# Patient Record
Sex: MALE | Race: WHITE | NOT HISPANIC OR LATINO | Employment: OTHER | URBAN - METROPOLITAN AREA
[De-identification: names, ages, dates, MRNs, and addresses within clinical notes are randomized per-mention and may not be internally consistent; named-entity substitution may affect disease eponyms.]

---

## 2021-04-15 ENCOUNTER — APPOINTMENT (OUTPATIENT)
Dept: RADIOLOGY | Facility: MEDICAL CENTER | Age: 86
DRG: 243 | End: 2021-04-15
Attending: EMERGENCY MEDICINE
Payer: COMMERCIAL

## 2021-04-15 ENCOUNTER — APPOINTMENT (OUTPATIENT)
Dept: CARDIOLOGY | Facility: MEDICAL CENTER | Age: 86
DRG: 243 | End: 2021-04-15
Attending: INTERNAL MEDICINE
Payer: COMMERCIAL

## 2021-04-15 ENCOUNTER — HOSPITAL ENCOUNTER (INPATIENT)
Facility: MEDICAL CENTER | Age: 86
LOS: 1 days | DRG: 243 | End: 2021-04-16
Attending: EMERGENCY MEDICINE | Admitting: INTERNAL MEDICINE
Payer: COMMERCIAL

## 2021-04-15 ENCOUNTER — APPOINTMENT (OUTPATIENT)
Dept: RADIOLOGY | Facility: MEDICAL CENTER | Age: 86
DRG: 243 | End: 2021-04-15
Attending: INTERNAL MEDICINE
Payer: COMMERCIAL

## 2021-04-15 ENCOUNTER — APPOINTMENT (OUTPATIENT)
Dept: CARDIOLOGY | Facility: MEDICAL CENTER | Age: 86
DRG: 243 | End: 2021-04-15
Attending: NURSE PRACTITIONER
Payer: COMMERCIAL

## 2021-04-15 DIAGNOSIS — I44.2 COMPLETE HEART BLOCK (HCC): ICD-10-CM

## 2021-04-15 DIAGNOSIS — I10 HYPERTENSION, UNSPECIFIED TYPE: ICD-10-CM

## 2021-04-15 PROBLEM — I73.9 PERIPHERAL ARTERY DISEASE (HCC): Chronic | Status: ACTIVE | Noted: 2021-04-15

## 2021-04-15 PROBLEM — K21.9 GERD (GASTROESOPHAGEAL REFLUX DISEASE): Status: ACTIVE | Noted: 2021-04-15

## 2021-04-15 PROBLEM — N40.0 BPH (BENIGN PROSTATIC HYPERPLASIA): Chronic | Status: ACTIVE | Noted: 2021-04-15

## 2021-04-15 PROBLEM — I16.0 HYPERTENSIVE URGENCY: Status: ACTIVE | Noted: 2021-04-15

## 2021-04-15 PROBLEM — E78.5 HYPERLIPIDEMIA: Chronic | Status: ACTIVE | Noted: 2021-04-15

## 2021-04-15 LAB
ALBUMIN SERPL BCP-MCNC: 4.1 G/DL (ref 3.2–4.9)
ALBUMIN/GLOB SERPL: 1.8 G/DL
ALP SERPL-CCNC: 69 U/L (ref 30–99)
ALT SERPL-CCNC: 13 U/L (ref 2–50)
ANION GAP SERPL CALC-SCNC: 7 MMOL/L (ref 7–16)
APTT PPP: 22.6 SEC (ref 24.7–36)
AST SERPL-CCNC: 19 U/L (ref 12–45)
BASOPHILS # BLD AUTO: 0.3 % (ref 0–1.8)
BASOPHILS # BLD: 0.03 K/UL (ref 0–0.12)
BILIRUB SERPL-MCNC: 0.4 MG/DL (ref 0.1–1.5)
BUN SERPL-MCNC: 20 MG/DL (ref 8–22)
CALCIUM SERPL-MCNC: 8.9 MG/DL (ref 8.5–10.5)
CHLORIDE SERPL-SCNC: 108 MMOL/L (ref 96–112)
CO2 SERPL-SCNC: 25 MMOL/L (ref 20–33)
CREAT SERPL-MCNC: 0.82 MG/DL (ref 0.5–1.4)
EKG IMPRESSION: NORMAL
EOSINOPHIL # BLD AUTO: 0.1 K/UL (ref 0–0.51)
EOSINOPHIL NFR BLD: 0.9 % (ref 0–6.9)
ERYTHROCYTE [DISTWIDTH] IN BLOOD BY AUTOMATED COUNT: 51.2 FL (ref 35.9–50)
GLOBULIN SER CALC-MCNC: 2.3 G/DL (ref 1.9–3.5)
GLUCOSE SERPL-MCNC: 95 MG/DL (ref 65–99)
HCT VFR BLD AUTO: 39.1 % (ref 42–52)
HGB BLD-MCNC: 12.7 G/DL (ref 14–18)
IMM GRANULOCYTES # BLD AUTO: 0.04 K/UL (ref 0–0.11)
IMM GRANULOCYTES NFR BLD AUTO: 0.4 % (ref 0–0.9)
INR PPP: 0.99 (ref 0.87–1.13)
LYMPHOCYTES # BLD AUTO: 5.06 K/UL (ref 1–4.8)
LYMPHOCYTES NFR BLD: 46.3 % (ref 22–41)
MAGNESIUM SERPL-MCNC: 2.2 MG/DL (ref 1.5–2.5)
MCH RBC QN AUTO: 31.8 PG (ref 27–33)
MCHC RBC AUTO-ENTMCNC: 32.5 G/DL (ref 33.7–35.3)
MCV RBC AUTO: 98 FL (ref 81.4–97.8)
MONOCYTES # BLD AUTO: 0.63 K/UL (ref 0–0.85)
MONOCYTES NFR BLD AUTO: 5.8 % (ref 0–13.4)
NEUTROPHILS # BLD AUTO: 5.07 K/UL (ref 1.82–7.42)
NEUTROPHILS NFR BLD: 46.3 % (ref 44–72)
NRBC # BLD AUTO: 0 K/UL
NRBC BLD-RTO: 0 /100 WBC
PHOSPHATE SERPL-MCNC: 3.1 MG/DL (ref 2.5–4.5)
PLATELET # BLD AUTO: 201 K/UL (ref 164–446)
PMV BLD AUTO: 10.6 FL (ref 9–12.9)
POTASSIUM SERPL-SCNC: 4.1 MMOL/L (ref 3.6–5.5)
PROT SERPL-MCNC: 6.4 G/DL (ref 6–8.2)
PROTHROMBIN TIME: 13.4 SEC (ref 12–14.6)
RBC # BLD AUTO: 3.99 M/UL (ref 4.7–6.1)
SARS-COV+SARS-COV-2 AG RESP QL IA.RAPID: NOTDETECTED
SODIUM SERPL-SCNC: 140 MMOL/L (ref 135–145)
SPECIMEN SOURCE: NORMAL
TROPONIN T SERPL-MCNC: 18 NG/L (ref 6–19)
TSH SERPL DL<=0.005 MIU/L-ACNC: 2.84 UIU/ML (ref 0.38–5.33)
WBC # BLD AUTO: 10.9 K/UL (ref 4.8–10.8)

## 2021-04-15 PROCEDURE — 85610 PROTHROMBIN TIME: CPT

## 2021-04-15 PROCEDURE — 700101 HCHG RX REV CODE 250

## 2021-04-15 PROCEDURE — 33208 INSRT HEART PM ATRIAL & VENT: CPT

## 2021-04-15 PROCEDURE — 99152 MOD SED SAME PHYS/QHP 5/>YRS: CPT | Performed by: INTERNAL MEDICINE

## 2021-04-15 PROCEDURE — C9803 HOPD COVID-19 SPEC COLLECT: HCPCS | Performed by: EMERGENCY MEDICINE

## 2021-04-15 PROCEDURE — 71045 X-RAY EXAM CHEST 1 VIEW: CPT

## 2021-04-15 PROCEDURE — 700111 HCHG RX REV CODE 636 W/ 250 OVERRIDE (IP): Performed by: INTERNAL MEDICINE

## 2021-04-15 PROCEDURE — 93005 ELECTROCARDIOGRAM TRACING: CPT | Performed by: EMERGENCY MEDICINE

## 2021-04-15 PROCEDURE — 84100 ASSAY OF PHOSPHORUS: CPT

## 2021-04-15 PROCEDURE — 700105 HCHG RX REV CODE 258: Performed by: INTERNAL MEDICINE

## 2021-04-15 PROCEDURE — 84484 ASSAY OF TROPONIN QUANT: CPT

## 2021-04-15 PROCEDURE — 85025 COMPLETE CBC W/AUTO DIFF WBC: CPT

## 2021-04-15 PROCEDURE — 99285 EMERGENCY DEPT VISIT HI MDM: CPT

## 2021-04-15 PROCEDURE — 99223 1ST HOSP IP/OBS HIGH 75: CPT | Mod: GC | Performed by: INTERNAL MEDICINE

## 2021-04-15 PROCEDURE — 700102 HCHG RX REV CODE 250 W/ 637 OVERRIDE(OP): Performed by: STUDENT IN AN ORGANIZED HEALTH CARE EDUCATION/TRAINING PROGRAM

## 2021-04-15 PROCEDURE — 84443 ASSAY THYROID STIM HORMONE: CPT

## 2021-04-15 PROCEDURE — 770020 HCHG ROOM/CARE - TELE (206)

## 2021-04-15 PROCEDURE — 93005 ELECTROCARDIOGRAM TRACING: CPT

## 2021-04-15 PROCEDURE — A9270 NON-COVERED ITEM OR SERVICE: HCPCS | Performed by: STUDENT IN AN ORGANIZED HEALTH CARE EDUCATION/TRAINING PROGRAM

## 2021-04-15 PROCEDURE — 02H63JZ INSERTION OF PACEMAKER LEAD INTO RIGHT ATRIUM, PERCUTANEOUS APPROACH: ICD-10-PCS | Performed by: INTERNAL MEDICINE

## 2021-04-15 PROCEDURE — 87426 SARSCOV CORONAVIRUS AG IA: CPT

## 2021-04-15 PROCEDURE — 33208 INSRT HEART PM ATRIAL & VENT: CPT | Mod: KX | Performed by: INTERNAL MEDICINE

## 2021-04-15 PROCEDURE — 700111 HCHG RX REV CODE 636 W/ 250 OVERRIDE (IP)

## 2021-04-15 PROCEDURE — 51798 US URINE CAPACITY MEASURE: CPT

## 2021-04-15 PROCEDURE — 02HK3JZ INSERTION OF PACEMAKER LEAD INTO RIGHT VENTRICLE, PERCUTANEOUS APPROACH: ICD-10-PCS | Performed by: INTERNAL MEDICINE

## 2021-04-15 PROCEDURE — 93005 ELECTROCARDIOGRAM TRACING: CPT | Performed by: INTERNAL MEDICINE

## 2021-04-15 PROCEDURE — 36415 COLL VENOUS BLD VENIPUNCTURE: CPT

## 2021-04-15 PROCEDURE — 83735 ASSAY OF MAGNESIUM: CPT

## 2021-04-15 PROCEDURE — 0JH606Z INSERTION OF PACEMAKER, DUAL CHAMBER INTO CHEST SUBCUTANEOUS TISSUE AND FASCIA, OPEN APPROACH: ICD-10-PCS | Performed by: INTERNAL MEDICINE

## 2021-04-15 PROCEDURE — 99223 1ST HOSP IP/OBS HIGH 75: CPT | Performed by: INTERNAL MEDICINE

## 2021-04-15 PROCEDURE — 85730 THROMBOPLASTIN TIME PARTIAL: CPT

## 2021-04-15 PROCEDURE — 80053 COMPREHEN METABOLIC PANEL: CPT

## 2021-04-15 RX ORDER — TRAMADOL HYDROCHLORIDE 50 MG/1
50 TABLET ORAL EVERY 6 HOURS PRN
Status: DISCONTINUED | OUTPATIENT
Start: 2021-04-15 | End: 2021-04-16 | Stop reason: HOSPADM

## 2021-04-15 RX ORDER — SIMVASTATIN 10 MG
5 TABLET ORAL NIGHTLY
Status: DISCONTINUED | OUTPATIENT
Start: 2021-04-15 | End: 2021-04-16 | Stop reason: HOSPADM

## 2021-04-15 RX ORDER — MIDAZOLAM HYDROCHLORIDE 1 MG/ML
INJECTION INTRAMUSCULAR; INTRAVENOUS
Status: COMPLETED
Start: 2021-04-15 | End: 2021-04-15

## 2021-04-15 RX ORDER — CEFAZOLIN SODIUM 2 G/100ML
2 INJECTION, SOLUTION INTRAVENOUS ONCE
Status: COMPLETED | OUTPATIENT
Start: 2021-04-15 | End: 2021-04-15

## 2021-04-15 RX ORDER — DORZOLAMIDE HYDROCHLORIDE AND TIMOLOL MALEATE 20; 5 MG/ML; MG/ML
1 SOLUTION/ DROPS OPHTHALMIC 2 TIMES DAILY
COMMUNITY

## 2021-04-15 RX ORDER — LANOLIN ALCOHOL/MO/W.PET/CERES
3 CREAM (GRAM) TOPICAL
COMMUNITY

## 2021-04-15 RX ORDER — OMEPRAZOLE 20 MG/1
20 CAPSULE, DELAYED RELEASE ORAL DAILY
Status: DISCONTINUED | OUTPATIENT
Start: 2021-04-16 | End: 2021-04-16 | Stop reason: HOSPADM

## 2021-04-15 RX ORDER — M-VIT,TX,IRON,MINS/CALC/FOLIC 27MG-0.4MG
1 TABLET ORAL DAILY
COMMUNITY

## 2021-04-15 RX ORDER — KETOCONAZOLE 20 MG/ML
SHAMPOO TOPICAL
COMMUNITY
End: 2021-04-15

## 2021-04-15 RX ORDER — BISACODYL 10 MG
10 SUPPOSITORY, RECTAL RECTAL
Status: DISCONTINUED | OUTPATIENT
Start: 2021-04-15 | End: 2021-04-16 | Stop reason: HOSPADM

## 2021-04-15 RX ORDER — AMMONIUM LACTATE 12 G/100G
LOTION TOPICAL PRN
COMMUNITY
End: 2021-04-15

## 2021-04-15 RX ORDER — LATANOPROST 50 UG/ML
1 SOLUTION/ DROPS OPHTHALMIC NIGHTLY
COMMUNITY

## 2021-04-15 RX ORDER — FINASTERIDE 5 MG/1
5 TABLET, FILM COATED ORAL DAILY
COMMUNITY

## 2021-04-15 RX ORDER — TAMSULOSIN HYDROCHLORIDE 0.4 MG/1
0.4 CAPSULE ORAL DAILY
COMMUNITY

## 2021-04-15 RX ORDER — SODIUM CHLORIDE 9 MG/ML
INJECTION, SOLUTION INTRAVENOUS CONTINUOUS
Status: DISCONTINUED | OUTPATIENT
Start: 2021-04-15 | End: 2021-04-16

## 2021-04-15 RX ORDER — BUPIVACAINE HYDROCHLORIDE 2.5 MG/ML
INJECTION, SOLUTION EPIDURAL; INFILTRATION; INTRACAUDAL
Status: COMPLETED
Start: 2021-04-15 | End: 2021-04-15

## 2021-04-15 RX ORDER — FINASTERIDE 5 MG/1
5 TABLET, FILM COATED ORAL DAILY
Status: DISCONTINUED | OUTPATIENT
Start: 2021-04-15 | End: 2021-04-16 | Stop reason: HOSPADM

## 2021-04-15 RX ORDER — POLYETHYLENE GLYCOL 3350 17 G/17G
1 POWDER, FOR SOLUTION ORAL
Status: DISCONTINUED | OUTPATIENT
Start: 2021-04-15 | End: 2021-04-16 | Stop reason: HOSPADM

## 2021-04-15 RX ORDER — HYDRALAZINE HYDROCHLORIDE 20 MG/ML
20 INJECTION INTRAMUSCULAR; INTRAVENOUS EVERY 6 HOURS PRN
Status: DISCONTINUED | OUTPATIENT
Start: 2021-04-15 | End: 2021-04-16 | Stop reason: HOSPADM

## 2021-04-15 RX ORDER — CEFAZOLIN SODIUM 2 G/100ML
2 INJECTION, SOLUTION INTRAVENOUS EVERY 8 HOURS
Status: COMPLETED | OUTPATIENT
Start: 2021-04-16 | End: 2021-04-16

## 2021-04-15 RX ORDER — UREA 10 %
3 LOTION (ML) TOPICAL
Status: DISCONTINUED | OUTPATIENT
Start: 2021-04-15 | End: 2021-04-16 | Stop reason: HOSPADM

## 2021-04-15 RX ORDER — LIDOCAINE HYDROCHLORIDE 20 MG/ML
INJECTION, SOLUTION INFILTRATION; PERINEURAL
Status: COMPLETED
Start: 2021-04-15 | End: 2021-04-15

## 2021-04-15 RX ORDER — SIMVASTATIN 5 MG
5 TABLET ORAL NIGHTLY
Status: ON HOLD | COMMUNITY
End: 2021-04-16

## 2021-04-15 RX ORDER — LATANOPROST 50 UG/ML
1 SOLUTION/ DROPS OPHTHALMIC NIGHTLY
Status: DISCONTINUED | OUTPATIENT
Start: 2021-04-15 | End: 2021-04-16 | Stop reason: HOSPADM

## 2021-04-15 RX ORDER — CEFAZOLIN SODIUM 1 G/3ML
INJECTION, POWDER, FOR SOLUTION INTRAMUSCULAR; INTRAVENOUS
Status: COMPLETED
Start: 2021-04-15 | End: 2021-04-15

## 2021-04-15 RX ORDER — M-VIT,TX,IRON,MINS/CALC/FOLIC 27MG-0.4MG
1 TABLET ORAL DAILY
Status: DISCONTINUED | OUTPATIENT
Start: 2021-04-15 | End: 2021-04-16 | Stop reason: HOSPADM

## 2021-04-15 RX ORDER — TAMSULOSIN HYDROCHLORIDE 0.4 MG/1
0.4 CAPSULE ORAL DAILY
Status: DISCONTINUED | OUTPATIENT
Start: 2021-04-15 | End: 2021-04-16

## 2021-04-15 RX ORDER — SODIUM CHLORIDE 9 MG/ML
1000 INJECTION, SOLUTION INTRAVENOUS ONCE
Status: DISCONTINUED | OUTPATIENT
Start: 2021-04-15 | End: 2021-04-15

## 2021-04-15 RX ORDER — OMEPRAZOLE 20 MG/1
20 CAPSULE, DELAYED RELEASE ORAL DAILY
COMMUNITY

## 2021-04-15 RX ORDER — AMOXICILLIN 250 MG
2 CAPSULE ORAL 2 TIMES DAILY
Status: DISCONTINUED | OUTPATIENT
Start: 2021-04-15 | End: 2021-04-16 | Stop reason: HOSPADM

## 2021-04-15 RX ADMIN — SODIUM CHLORIDE: 9 INJECTION, SOLUTION INTRAVENOUS at 17:38

## 2021-04-15 RX ADMIN — Medication 2 TABLET: at 17:33

## 2021-04-15 RX ADMIN — SODIUM CHLORIDE: 9 INJECTION, SOLUTION INTRAVENOUS at 13:01

## 2021-04-15 RX ADMIN — ASPIRIN 81 MG: 81 TABLET, COATED ORAL at 17:32

## 2021-04-15 RX ADMIN — DOCUSATE SODIUM 50 MG AND SENNOSIDES 8.6 MG 2 TABLET: 8.6; 5 TABLET, FILM COATED ORAL at 17:33

## 2021-04-15 RX ADMIN — FINASTERIDE 5 MG: 5 TABLET, FILM COATED ORAL at 17:33

## 2021-04-15 RX ADMIN — LIDOCAINE HYDROCHLORIDE: 20 INJECTION, SOLUTION INFILTRATION; PERINEURAL at 16:01

## 2021-04-15 RX ADMIN — CEFAZOLIN SODIUM 2 G: 2 INJECTION, SOLUTION INTRAVENOUS at 17:38

## 2021-04-15 RX ADMIN — Medication 1 TABLET: at 17:33

## 2021-04-15 RX ADMIN — TAMSULOSIN HYDROCHLORIDE 0.4 MG: 0.4 CAPSULE ORAL at 17:33

## 2021-04-15 RX ADMIN — SIMVASTATIN 5 MG: 10 TABLET, FILM COATED ORAL at 21:30

## 2021-04-15 RX ADMIN — MIDAZOLAM HYDROCHLORIDE 1.5 MG: 1 INJECTION, SOLUTION INTRAMUSCULAR; INTRAVENOUS at 16:18

## 2021-04-15 RX ADMIN — LATANOPROST 1 DROP: 50 SOLUTION OPHTHALMIC at 21:31

## 2021-04-15 RX ADMIN — CEFAZOLIN 3000 MG: 330 INJECTION, POWDER, FOR SOLUTION INTRAMUSCULAR; INTRAVENOUS at 16:01

## 2021-04-15 RX ADMIN — BUPIVACAINE HYDROCHLORIDE: 2.5 INJECTION, SOLUTION EPIDURAL; INFILTRATION; INTRACAUDAL; PERINEURAL at 16:01

## 2021-04-15 RX ADMIN — Medication 3 MG: at 21:31

## 2021-04-15 RX ADMIN — FENTANYL CITRATE 50 MCG: 50 INJECTION, SOLUTION INTRAMUSCULAR; INTRAVENOUS at 16:19

## 2021-04-15 ASSESSMENT — COGNITIVE AND FUNCTIONAL STATUS - GENERAL
HELP NEEDED FOR BATHING: A LITTLE
EATING MEALS: A LITTLE
CLIMB 3 TO 5 STEPS WITH RAILING: A LITTLE
SUGGESTED CMS G CODE MODIFIER DAILY ACTIVITY: CK
DRESSING REGULAR UPPER BODY CLOTHING: A LITTLE
DAILY ACTIVITIY SCORE: 19
SUGGESTED CMS G CODE MODIFIER MOBILITY: CK
MOVING FROM LYING ON BACK TO SITTING ON SIDE OF FLAT BED: A LITTLE
MOBILITY SCORE: 18
MOVING TO AND FROM BED TO CHAIR: A LITTLE
TURNING FROM BACK TO SIDE WHILE IN FLAT BAD: A LITTLE
DRESSING REGULAR LOWER BODY CLOTHING: A LITTLE
STANDING UP FROM CHAIR USING ARMS: A LITTLE
WALKING IN HOSPITAL ROOM: A LITTLE
TOILETING: A LITTLE

## 2021-04-15 ASSESSMENT — ENCOUNTER SYMPTOMS
BLURRED VISION: 0
ORTHOPNEA: 0
PALPITATIONS: 0
BRUISES/BLEEDS EASILY: 0
CARDIOVASCULAR NEGATIVE: 1
NECK PAIN: 0
NAUSEA: 0
SHORTNESS OF BREATH: 0
CHILLS: 0
SENSORY CHANGE: 0
SORE THROAT: 0
FEVER: 0
DIZZINESS: 0
FALLS: 0
HALLUCINATIONS: 0
VOMITING: 0
SPEECH CHANGE: 0
COUGH: 0
DOUBLE VISION: 0
BACK PAIN: 0
RESPIRATORY NEGATIVE: 1
NEUROLOGICAL NEGATIVE: 1
ABDOMINAL PAIN: 0
CLAUDICATION: 0
HEADACHES: 0
NERVOUS/ANXIOUS: 0
PND: 0

## 2021-04-15 ASSESSMENT — FIBROSIS 4 INDEX: FIB4 SCORE: 2.23

## 2021-04-15 ASSESSMENT — PAIN DESCRIPTION - PAIN TYPE
TYPE: ACUTE PAIN
TYPE: ACUTE PAIN

## 2021-04-15 NOTE — CONSULTS
Cardiology Initial Consultation    Date of Service  4/15/2021    Referring Physician  Ivan Oreilly M.D.    Reason for Consultation  Complete heart block    History of Presenting Illness  Pablito Ospina is a 86 y.o. [facility].  He was seen today for his annual wellness check and found to be very bradycardic.  EKG confirmed the patient was in complete heart block and he was transferred here for further care.  The patient denies exertional dyspnea, fainting or lightheadedness, or chest pain.    He has a history of peripheral vascular disease and [facility].  The patient does not recall the details but was told he is not a surgical candidate.  He quit smoking at that time.  There is no family history of coronary disease.  Patient does not know much is about his mother's or father's health.    He has history of hyperlipidemia.  He quit smoking 6 years ago.    Medications on admission omeprazole 20 mg a day simvastatin 5 mg a day tamsulosin 0.4 mg daily.    Review of Systems  Review of Systems   Constitutional: Negative for fever.   HENT: Negative.    Eyes: Negative for visual disturbance.   Respiratory: Negative.    Cardiovascular: Negative.  Negative for chest pain, palpitations and leg swelling.   Gastrointestinal: Negative for abdominal pain.   Musculoskeletal:        Claudication   Neurological: Negative.  Negative for dizziness.   Hematological: Does not bruise/bleed easily.       Past Medical History   has no past medical history on file.    Surgical History   has no past surgical history on file.    Family History  family history is not on file.    Social History   reports that he has never smoked. He has never used smokeless tobacco. He reports previous alcohol use. He reports previous drug use.    Medications  Prior to Admission Medications   Prescriptions Last Dose Informant Patient  Reported? Taking?   PSYLLIUM PO   Yes Yes   Sig: Take  by mouth every day.   ammonium lactate (LAC-HYDRIN) 12 % Lotion   Yes Yes   Sig: Apply  topically as needed (for dry skin).   aspirin EC (ECOTRIN) 81 MG Tablet Delayed Response   Yes Yes   Sig: Take 81 mg by mouth every day.   calcium carbonate-vitamin D (CALCIUM 500/D) 500-200 MG-UNIT Tab   Yes Yes   Sig: Take 1 tablet by mouth every morning with breakfast.   finasteride (PROSCAR) 5 MG Tab   Yes Yes   Sig: Take 5 mg by mouth every day.   fluocinonide (LIDEX) 0.05 % Cream   Yes Yes   Sig: Apply  topically 2 times a day. For dermatitis   ketoconazole (NIZORAL) 2 % shampoo   Yes Yes   Sig: Apply  topically 1 time a day as needed for Itching.   latanoprost (XALATAN) 0.005 % Solution   Yes Yes   Sig: Administer 1 Drop into both eyes every evening.   melatonin 3 MG Tab   Yes Yes   Sig: Take 3 mg by mouth at bedtime.   omeprazole (PRILOSEC) 20 MG delayed-release capsule   Yes Yes   Sig: Take 20 mg by mouth every day.   simvastatin (ZOCOR) 5 MG Tab   Yes Yes   Sig: Take 5 mg by mouth every evening.   tamsulosin (FLOMAX) 0.4 MG capsule   Yes Yes   Sig: Take 0.4 mg by mouth 1/2 hour after breakfast.   therapeutic multivitamin-minerals (THERAGRAN-M) Tab   Yes Yes   Sig: Take 1 tablet by mouth every day.      Facility-Administered Medications: None       Allergies  Allergies   Allergen Reactions   • Gabapentin    • Lisinopril        Vital signs in last 24 hours  Temp:  [36.3 °C (97.4 °F)] 36.3 °C (97.4 °F)  Pulse:  [32-35] 32  Resp:  [16] 16  BP: (164-192)/() 192/100  SpO2:  [91 %-100 %] 91 %    Physical Exam  Physical Exam  Constitutional:       General: He is not in acute distress.     Appearance: He is well-developed. He is not diaphoretic.   HENT:      Head: Atraumatic.   Eyes:      General: No scleral icterus.     Conjunctiva/sclera: Conjunctivae normal.      Pupils: Pupils are equal, round, and reactive to light.   Neck:      Thyroid: No thyromegaly.       Vascular: No JVD.   Cardiovascular:      Rate and Rhythm: Regular rhythm. Bradycardia present.      Pulses:           Femoral pulses are 1+ on the right side with bruit and 1+ on the left side.       Posterior tibial pulses are 0 on the right side and 0 on the left side.      Heart sounds: No murmur.      Comments: Heart rate regular at 32 bpm  Pulmonary:      Effort: Pulmonary effort is normal. No respiratory distress.      Breath sounds: Normal breath sounds. No wheezing or rales.   Abdominal:      General: Bowel sounds are normal. There is no distension.      Palpations: Abdomen is soft. There is no mass.      Tenderness: There is no abdominal tenderness.      Comments: No evidence of aortic aneurysm   Musculoskeletal:      Cervical back: Neck supple.   Skin:     General: Skin is warm and dry.   Neurological:      Mental Status: He is alert and oriented to person, place, and time.         Lab Review  Lab Results   Component Value Date/Time    WBC 10.9 (H) 04/15/2021 12:26 PM    RBC 3.99 (L) 04/15/2021 12:26 PM    HEMOGLOBIN 12.7 (L) 04/15/2021 12:26 PM    HEMATOCRIT 39.1 (L) 04/15/2021 12:26 PM    MCV 98.0 (H) 04/15/2021 12:26 PM    MCH 31.8 04/15/2021 12:26 PM    MCHC 32.5 (L) 04/15/2021 12:26 PM    MPV 10.6 04/15/2021 12:26 PM      No results found for: SODIUM, POTASSIUM, CHLORIDE, CO2, GLUCOSE, BUN, CREATININE, BUNCREATRAT, GLOMRATE   No results found for: ASTSGOT, ALTSGPT  No results found for: CHOLSTRLTOT, LDL, HDL, TRIGLYCERIDE, TROPONINT    No results for input(s): NTPROBNP in the last 72 hours.    Cardiac Imaging and Procedures Review  EKG:  My personal interpretation of the EKG sinus rhythm with complete heart block ventricular rate 32 bpm.    Complete heart block: The duration is uncertain.  [facility].  He said no dizziness or syncope.  EKG confirms complete heart block and he will be admitted for permanent pacemaker.    Peripheral vascular disease: Patient  has stigmata of peripheral vascular disease on exam with right femoral bruit and absent posterior tibial pulses.  [facility] by his report and was told that he was not an operative candidate.  He does have claudication by history.    History of hyperlipidemia treated with statin.    I discussed the pacemaker procedure with the patient today.  He will be evaluated by EP for pacemaker implantation.      Terrance Perez M.D.   Cardiologist, [facility]  [phone]

## 2021-04-15 NOTE — ASSESSMENT & PLAN NOTE
Asymptomatic third degree heart block  Hemodynamically stable  Home medications reviewed; timolol eyedrops could be contributing factor  TSH WNL  Cardiology consulted and on board  - Pacemaker placement today  - Emergent TTE   - Continuous telemetry  - Continuous pulse oximetry  - No beta-blockers  - Replete electrolytes PRN  - Hold home timolol eye drops  - Appreciate cardiology support and recommendations

## 2021-04-15 NOTE — ASSESSMENT & PLAN NOTE
Peripheral arterial disease w/ hx of bilateral lower extremity claudication  - Continue home ASA  - Continue home statin

## 2021-04-15 NOTE — OP REPORT
[facility]     PROCEDURE PERFORMED: Permanent Pacemaker Implantation    DATE OF SERVICE: 4/15/2021    : Arlyn Brumfield MD    ASSISTANT: None    ANESTHESIA: Local and moderate sedation (start time 1554, stop time 1621, total dose given 1.5 mg IV versed, 50 mcg IV fentanyl)    EBL: 30 cc    SPECIMENS: None    INDICATION(S):  Complete heart block    COMPLICATION(S): None    DESCRIPTION OF PROCEDURE:  After informed written consent, the patient was brought to the electrophysiology lab in the fasting, unsedated state. The patient was prepped and draped in the usual sterile fashion. The procedure was performed under moderate sedation with local anesthetic. A left upper extremity venogram was performed, demonstrating a patent subclavian vein. A left infraclavicular incision was made with a scalpel and the pectoral device pocket was created using a combination of blunt dissection and electrocautery. The modified Seldinger technique was used to gain access to the left axillary vein. A peel-away hemostasis sheath was placed in the vein. Under fluoroscopic guidance, the pacemaker leads were introduced into the heart. The ventricular lead was advanced to the RVOT and then lowered into position at the RV apex. The atrial lead was positioned on R atrial appendage. The leads were tested and had satisfactory sensing and pacing parameters. High output ventricular pacing did not produce extracardiac stimulation. The leads were sutured to the underlying pectoral muscle with interrupted silk over a silastic suture sleeve. The device pocket was irrigated with antibiotic solution, inspected, and no bleeding was seen. The leads were connected to the pacemaker pulse generator and the device was inserted into the pocket. The wound was closed with three layers of absorbable sutures. Following recovery from sedation, the patient was transferred to a monitored bed in good condition.     IMPLANTED DEVICE INFORMATION:  Pulse generator  is a MDT model W3DR01  Serial # [id number]    LEAD INFORMATION:  1)Right atrial lead is a MDT model #5076-52, serial #[id number] ,P wave 1.9 millivolts, threshold 0.5 Volts at 0.5 milliseconds, pacing impedance 627 Ohms.    2)Right ventricular lead is a MDT model #5076-58 , serial #[id number] ,R wave PACED millivolts, threshold 0.5 Volts at 0.5 milliseconds, pacing impedance 589 Ohms.    DEVICE PROGRAMMING:  DDDR 60 -130 ppm    FLUOROSCOPY TIME: 2.8 minutes    IMPRESSIONS:  1. Successful dual chamber pacemaker implantation    RECOMMENDATIONS:  1. Transfer to monitored bed  2. Chest x-ray  3. Device interrogation prior to hospital discharge  4. Followup in device clinic

## 2021-04-15 NOTE — ED NOTES
Med Rec completed per patient and home pharmacy ([facility])  Allergies reviewed  No ORAL antibiotics in last 14 days      Medication Sig   • finasteride (PROSCAR) 5 MG Tab Take 5 mg by mouth every day.   • latanoprost (XALATAN) 0.005 % Solution Administer 1 Drop into both eyes every evening.   • melatonin 3 MG Tab Take 3 mg by mouth at bedtime.   • omeprazole (PRILOSEC) 20 MG delayed-release capsule Take 20 mg by mouth every day.   • PSYLLIUM PO Take 1 Dose by mouth every day.   • simvastatin (ZOCOR) 5 MG Tab Take 5 mg by mouth every evening.   • tamsulosin (FLOMAX) 0.4 MG capsule Take 0.4 mg by mouth every day.   • aspirin EC (ECOTRIN) 81 MG Tablet Delayed Response Take 81 mg by mouth every day.   • calcium carbonate-vitamin D (CALCIUM 500/D) 500-200 MG-UNIT Tab Take 1 tablet by mouth every day.   • therapeutic multivitamin-minerals (THERAGRAN-M) Tab Take 1 tablet by mouth every day.   • dorzolamide-timolol (COSOPT) 22.3-6.8 MG/ML Solution Administer 1 Drop into both eyes 2 times a day.

## 2021-04-15 NOTE — ASSESSMENT & PLAN NOTE
Hypertensive urgency w/o end-organ failure  Asymptomatic  - Hydralazine PRN systolic pressures >180  - No beta-blockers  - Telemetry

## 2021-04-15 NOTE — ED TRIAGE NOTES
Pt BIB EMS from [facility].  Pt was having his normal annual check up and when they checked his vitals they found that his HR was 30.  Pt found to be in a 3rd degree HB.  Pt asymptomatic.  Pt recently has been having constipation, was able to have BM today.  No blood in stool.  EKG completed on arrival.  ERP to see.

## 2021-04-15 NOTE — H&P
History & Physical Note    Date of Admission: 4/15/2021  Admission Status: Inpatient  [facility] Team: [facility]ULO Admitting Team  Attending: Monty Sepulveda M.D.   Senior Resident: CLINT Johnson M.D.  Contact Number: [phone]    Chief Complaint:   Third degree heart block    History of Present Illness (HPI):  86M  w/ no significant cardiac history was transferred to [facility] emergency department from [facility] by [facility] for third degree heart block. Patient proceeded to [facility] clinic for annual exam this morning where vitals were markedly abnormal w/ a heart rate in the low 30s. EKGx2 depicted a third degree heart block prompting [facility] dispatch for transport to [facility] ED. Patient was asymptomatic at clinic and upon arrival to [facility] ED. Patient was evaluated promptly by cardiology and scheduled for pacemaker placement in operating room today. Systolic pressures notably high in the 180-200s mmHg. Patient is not on antihypertensive medications including beta-blockers at home, however timolol eye drops could be contributing to presentation. Patient denies history of arrhythmia, coronary heart disease, valvular disease, or ACS in the recent or remote past. Patient is not on any form of anticoagulation. He does acknowledge history of peripheral arterial disease of the bilateral lower extremities, however, has been managed conservatively w/ statin. Patient lives alone in [place]. Last meal was last night. DNR/DNI per discussion.     Review of Systems:   Review of Systems   Constitutional: Negative for chills and fever.   HENT: Negative for congestion and sore throat.    Eyes: Negative for blurred vision and double vision.   Respiratory: Negative for cough and shortness of breath.    Cardiovascular: Negative for chest pain, palpitations, orthopnea, claudication, leg swelling and PND.   Gastrointestinal: Negative for abdominal pain, nausea and vomiting.   Genitourinary: Negative for frequency and urgency.   Musculoskeletal: Negative  for back pain, falls, joint pain and neck pain.   Skin: Negative for itching and rash.   Neurological: Negative for dizziness, sensory change, speech change and headaches.   Endo/Heme/Allergies: Negative for environmental allergies. Does not bruise/bleed easily.   Psychiatric/Behavioral: Negative for hallucinations. The patient is not nervous/anxious.        Past Medical History:   Past Medical History was reviewed with patient.  GERD  BPH  Peripheral vascular disease  Hyperlipidemia    Past Surgical History: Past Surgical History was reviewed with patient.    Medications: Medications have been reviewed with patient.  Prior to Admission Medications   Prescriptions Last Dose Informant Patient Reported? Taking?   PSYLLIUM PO   Yes Yes   Sig: Take  by mouth every day.   ammonium lactate (LAC-HYDRIN) 12 % Lotion   Yes Yes   Sig: Apply  topically as needed (for dry skin).   aspirin EC (ECOTRIN) 81 MG Tablet Delayed Response   Yes Yes   Sig: Take 81 mg by mouth every day.   calcium carbonate-vitamin D (CALCIUM 500/D) 500-200 MG-UNIT Tab   Yes Yes   Sig: Take 1 tablet by mouth every morning with breakfast.   finasteride (PROSCAR) 5 MG Tab   Yes Yes   Sig: Take 5 mg by mouth every day.   ketoconazole (NIZORAL) 2 % shampoo   Yes Yes   Sig: Apply  topically 1 time a day as needed for Itching.   latanoprost (XALATAN) 0.005 % Solution   Yes Yes   Sig: Administer 1 Drop into both eyes every evening.   melatonin 3 MG Tab   Yes Yes   Sig: Take 3 mg by mouth at bedtime.   omeprazole (PRILOSEC) 20 MG delayed-release capsule   Yes Yes   Sig: Take 20 mg by mouth every day.   simvastatin (ZOCOR) 5 MG Tab   Yes Yes   Sig: Take 5 mg by mouth every evening.   tamsulosin (FLOMAX) 0.4 MG capsule   Yes Yes   Sig: Take 0.4 mg by mouth 1/2 hour after breakfast.   therapeutic multivitamin-minerals (THERAGRAN-M) Tab   Yes Yes   Sig: Take 1 tablet by mouth every day.      Facility-Administered Medications: None        Allergies: Allergies have  been reviewed with patient.  Allergies   Allergen Reactions   • Gabapentin    • Lisinopril        Family History:  Reviewed, patient unaware of family history of premature heart disease,    Social History:   Tobacco: 1ppd x 30+ years, quit 6 years ago   Alcohol: Abstinent for 5 years   Recreational drugs (illegal and prescription):  Denies   Employment: Retired   Activity Level:  ADL/IADL independent  Living situation:  Lives alone in [place], [place]  Recent travel:  None  [facility]    Physical Exam:     Vitals:  Temp:  [36.2 °C (97.1 °F)-36.3 °C (97.4 °F)] 36.2 °C (97.1 °F)  Pulse:  [32-38] 38  Resp:  [16] 16  BP: (164-200)/() 193/60  SpO2:  [91 %-100 %] 99 %    Physical Exam  Vitals and nursing note reviewed.   Constitutional:       Appearance: Normal appearance. He is normal weight.   HENT:      Head: Normocephalic and atraumatic.      Mouth/Throat:      Mouth: Mucous membranes are moist.      Pharynx: Oropharynx is clear.   Eyes:      Extraocular Movements: Extraocular movements intact.      Conjunctiva/sclera: Conjunctivae normal.      Pupils: Pupils are equal, round, and reactive to light.   Cardiovascular:      Rate and Rhythm: Regular rhythm. Bradycardia present.      Pulses: Normal pulses.      Heart sounds: No murmur. No gallop.    Pulmonary:      Effort: Pulmonary effort is normal.      Breath sounds: Normal breath sounds.   Abdominal:      General: Bowel sounds are normal. There is no distension.      Palpations: Abdomen is soft.      Tenderness: There is no abdominal tenderness.   Musculoskeletal:         General: No swelling or tenderness.      Cervical back: Normal range of motion.   Skin:     General: Skin is warm and dry.      Capillary Refill: Capillary refill takes less than 2 seconds.   Neurological:      General: No focal deficit present.      Mental Status: He is alert and oriented to person, place, and time. Mental status is at baseline.   Psychiatric:         Mood  and Affect: Mood normal.         Behavior: Behavior normal.         Labs:   Results for orders placed or performed during the hospital encounter of 04/15/21   CBC WITH DIFFERENTIAL   Result Value Ref Range    WBC 10.9 (H) 4.8 - 10.8 K/uL    RBC 3.99 (L) 4.70 - 6.10 M/uL    Hemoglobin 12.7 (L) 14.0 - 18.0 g/dL    Hematocrit 39.1 (L) 42.0 - 52.0 %    MCV 98.0 (H) 81.4 - 97.8 fL    MCH 31.8 27.0 - 33.0 pg    MCHC 32.5 (L) 33.7 - 35.3 g/dL    RDW 51.2 (H) 35.9 - 50.0 fL    Platelet Count 201 164 - 446 K/uL    MPV 10.6 9.0 - 12.9 fL    Neutrophils-Polys 46.30 44.00 - 72.00 %    Lymphocytes 46.30 (H) 22.00 - 41.00 %    Monocytes 5.80 0.00 - 13.40 %    Eosinophils 0.90 0.00 - 6.90 %    Basophils 0.30 0.00 - 1.80 %    Immature Granulocytes 0.40 0.00 - 0.90 %    Nucleated RBC 0.00 /100 WBC    Neutrophils (Absolute) 5.07 1.82 - 7.42 K/uL    Lymphs (Absolute) 5.06 (H) 1.00 - 4.80 K/uL    Monos (Absolute) 0.63 0.00 - 0.85 K/uL    Eos (Absolute) 0.10 0.00 - 0.51 K/uL    Baso (Absolute) 0.03 0.00 - 0.12 K/uL    Immature Granulocytes (abs) 0.04 0.00 - 0.11 K/uL    NRBC (Absolute) 0.00 K/uL   COMP METABOLIC PANEL   Result Value Ref Range    Sodium 140 135 - 145 mmol/L    Potassium 4.1 3.6 - 5.5 mmol/L    Chloride 108 96 - 112 mmol/L    Co2 25 20 - 33 mmol/L    Anion Gap 7.0 7.0 - 16.0    Glucose 95 65 - 99 mg/dL    Bun 20 8 - 22 mg/dL    Creatinine 0.82 0.50 - 1.40 mg/dL    Calcium 8.9 8.5 - 10.5 mg/dL    AST(SGOT) 19 12 - 45 U/L    ALT(SGPT) 13 2 - 50 U/L    Alkaline Phosphatase 69 30 - 99 U/L    Total Bilirubin 0.4 0.1 - 1.5 mg/dL    Albumin 4.1 3.2 - 4.9 g/dL    Total Protein 6.4 6.0 - 8.2 g/dL    Globulin 2.3 1.9 - 3.5 g/dL    A-G Ratio 1.8 g/dL   TROPONIN   Result Value Ref Range    Troponin T 18 6 - 19 ng/L   PROTHROMBIN TIME (INR)   Result Value Ref Range    PT 13.4 12.0 - 14.6 sec    INR 0.99 0.87 - 1.13   APTT   Result Value Ref Range    APTT 22.6 (L) 24.7 - 36.0 sec   TSH   Result Value Ref Range    TSH 2.840 0.380 -  5.330 uIU/mL   SARS-COV Antigen [place]: Collect dry nasal swab AND NP swab in VTM   Result Value Ref Range    SARS-CoV-2 Source Nasal Swab     SARS-COV ANTIGEN [place] NotDetected Not-Detected   ESTIMATED GFR   Result Value Ref Range    GFR If African American >60 >60 mL/min/1.73 m 2    GFR If Non African American >60 >60 mL/min/1.73 m 2   MAGNESIUM   Result Value Ref Range    Magnesium 2.2 1.5 - 2.5 mg/dL   PHOSPHORUS   Result Value Ref Range    Phosphorus 3.1 2.5 - 4.5 mg/dL   EKG   Result Value Ref Range    Report       [facility] Emergency Dept.    Test Date:  2021-04-15  Pt Name:    WILL GARCIA                 Department: ER  MRN:        [id number]                      Room:        06  Gender:     Male                         Technician: [id number]  :        [id number]                   Requested By:ER TRIAGE PROTOCOL  Order #:    [id number]                    Reading MD:    Measurements  Intervals                                Axis  Rate:       35                           P:          69  OH:                                      QRS:        109  QRSD:       148                          T:          48  QT:         536  QTc:        409    Interpretive Statements  COMPLETE AV BLOCK WITH WIDE QRS COMPLEX  No previous ECG available for comparison       Imaging:   DX-CHEST-PORTABLE (1 VIEW)   Final Result      Cardiac silhouette enlargement.      No acute abnormality.      CL-PERMANENT PACEMAKER INSERTION    (Results Pending)   EC-ECHOCARDIOGRAM COMPLETE W/O CONT    (Results Pending)       Previous Data Review: reviewed    * Third degree heart block (HCC)- (present on admission)  Assessment & Plan  Asymptomatic third degree heart block  Hemodynamically stable  Home medications reviewed; timolol eyedrops could be contributing factor  TSH WNL  Cardiology consulted and on board  - Pacemaker placement today  - Emergent TTE   - Continuous telemetry  - Continuous pulse oximetry  - No beta-blockers  - Replete  electrolytes PRN  - Hold home timolol eye drops  - Appreciate cardiology support and recommendations    Hypertensive urgency- (present on admission)  Assessment & Plan  Hypertensive urgency w/o end-organ failure  Asymptomatic  - Hydralazine PRN systolic pressures >180  - No beta-blockers  - Telemetry    Hyperlipidemia- (present on admission)  Assessment & Plan  Chronic  Continue statin  - Lipid panel    BPH (benign prostatic hyperplasia)- (present on admission)  Assessment & Plan  Chronic  Continue home finasteride and tamsulosin  Bladder scan PRN for retention    GERD (gastroesophageal reflux disease)  Assessment & Plan  Chronic  - Continue PPi    Peripheral artery disease (HCC)- (present on admission)  Assessment & Plan  Peripheral arterial disease w/ hx of bilateral lower extremity claudication  - Continue home ASA  - Continue home statin

## 2021-04-15 NOTE — ED NOTES
"Pt reports that he last ate lact night at 6pm, pt has been drinking coffee today with a \"splash\" of milk in it.  Pt aware of NPO status.    "

## 2021-04-16 ENCOUNTER — APPOINTMENT (OUTPATIENT)
Dept: CARDIOLOGY | Facility: MEDICAL CENTER | Age: 86
DRG: 243 | End: 2021-04-16
Attending: NURSE PRACTITIONER
Payer: COMMERCIAL

## 2021-04-16 ENCOUNTER — APPOINTMENT (OUTPATIENT)
Dept: RADIOLOGY | Facility: MEDICAL CENTER | Age: 86
DRG: 243 | End: 2021-04-16
Attending: NURSE PRACTITIONER
Payer: COMMERCIAL

## 2021-04-16 VITALS
HEIGHT: 72 IN | RESPIRATION RATE: 17 BRPM | OXYGEN SATURATION: 98 % | HEART RATE: 60 BPM | WEIGHT: 154.98 LBS | SYSTOLIC BLOOD PRESSURE: 156 MMHG | DIASTOLIC BLOOD PRESSURE: 58 MMHG | TEMPERATURE: 97.8 F | BODY MASS INDEX: 20.99 KG/M2

## 2021-04-16 LAB
ALBUMIN SERPL BCP-MCNC: 3.4 G/DL (ref 3.2–4.9)
ALBUMIN/GLOB SERPL: 1.5 G/DL
ALP SERPL-CCNC: 63 U/L (ref 30–99)
ALT SERPL-CCNC: 10 U/L (ref 2–50)
ANION GAP SERPL CALC-SCNC: 7 MMOL/L (ref 7–16)
ANISOCYTOSIS BLD QL SMEAR: ABNORMAL
AST SERPL-CCNC: 21 U/L (ref 12–45)
BASOPHILS # BLD AUTO: 0.9 % (ref 0–1.8)
BASOPHILS # BLD: 0.11 K/UL (ref 0–0.12)
BILIRUB SERPL-MCNC: 0.4 MG/DL (ref 0.1–1.5)
BUN SERPL-MCNC: 16 MG/DL (ref 8–22)
CALCIUM SERPL-MCNC: 8.8 MG/DL (ref 8.5–10.5)
CHLORIDE SERPL-SCNC: 107 MMOL/L (ref 96–112)
CO2 SERPL-SCNC: 22 MMOL/L (ref 20–33)
CREAT SERPL-MCNC: 0.76 MG/DL (ref 0.5–1.4)
EKG IMPRESSION: NORMAL
EKG IMPRESSION: NORMAL
EOSINOPHIL # BLD AUTO: 0 K/UL (ref 0–0.51)
EOSINOPHIL NFR BLD: 0 % (ref 0–6.9)
ERYTHROCYTE [DISTWIDTH] IN BLOOD BY AUTOMATED COUNT: 49.6 FL (ref 35.9–50)
GLOBULIN SER CALC-MCNC: 2.2 G/DL (ref 1.9–3.5)
GLUCOSE SERPL-MCNC: 90 MG/DL (ref 65–99)
HCT VFR BLD AUTO: 37.3 % (ref 42–52)
HGB BLD-MCNC: 12 G/DL (ref 14–18)
LV EJECT FRACT  99904: 45
LV EJECT FRACT MOD 4C 99902: 60.23
LYMPHOCYTES # BLD AUTO: 6.04 K/UL (ref 1–4.8)
LYMPHOCYTES NFR BLD: 49.1 % (ref 22–41)
MACROCYTES BLD QL SMEAR: ABNORMAL
MAGNESIUM SERPL-MCNC: 2 MG/DL (ref 1.5–2.5)
MANUAL DIFF BLD: NORMAL
MCH RBC QN AUTO: 31.3 PG (ref 27–33)
MCHC RBC AUTO-ENTMCNC: 32.2 G/DL (ref 33.7–35.3)
MCV RBC AUTO: 97.1 FL (ref 81.4–97.8)
MONOCYTES # BLD AUTO: 0.65 K/UL (ref 0–0.85)
MONOCYTES NFR BLD AUTO: 5.3 % (ref 0–13.4)
MORPHOLOGY BLD-IMP: NORMAL
NEUTROPHILS # BLD AUTO: 5.5 K/UL (ref 1.82–7.42)
NEUTROPHILS NFR BLD: 44.7 % (ref 44–72)
NRBC # BLD AUTO: 0.03 K/UL
NRBC BLD-RTO: 0.2 /100 WBC
PHOSPHATE SERPL-MCNC: 3.2 MG/DL (ref 2.5–4.5)
PLATELET # BLD AUTO: 173 K/UL (ref 164–446)
PLATELET BLD QL SMEAR: NORMAL
PMV BLD AUTO: 10.8 FL (ref 9–12.9)
POTASSIUM SERPL-SCNC: 3.5 MMOL/L (ref 3.6–5.5)
PROT SERPL-MCNC: 5.6 G/DL (ref 6–8.2)
RBC # BLD AUTO: 3.84 M/UL (ref 4.7–6.1)
RBC BLD AUTO: PRESENT
SODIUM SERPL-SCNC: 136 MMOL/L (ref 135–145)
WBC # BLD AUTO: 12.3 K/UL (ref 4.8–10.8)

## 2021-04-16 PROCEDURE — 80053 COMPREHEN METABOLIC PANEL: CPT

## 2021-04-16 PROCEDURE — 93010 ELECTROCARDIOGRAM REPORT: CPT | Performed by: INTERNAL MEDICINE

## 2021-04-16 PROCEDURE — 93005 ELECTROCARDIOGRAM TRACING: CPT | Performed by: NURSE PRACTITIONER

## 2021-04-16 PROCEDURE — 700117 HCHG RX CONTRAST REV CODE 255: Performed by: NURSE PRACTITIONER

## 2021-04-16 PROCEDURE — 71045 X-RAY EXAM CHEST 1 VIEW: CPT

## 2021-04-16 PROCEDURE — 700111 HCHG RX REV CODE 636 W/ 250 OVERRIDE (IP): Performed by: STUDENT IN AN ORGANIZED HEALTH CARE EDUCATION/TRAINING PROGRAM

## 2021-04-16 PROCEDURE — 93010 ELECTROCARDIOGRAM REPORT: CPT | Mod: 77 | Performed by: INTERNAL MEDICINE

## 2021-04-16 PROCEDURE — 700111 HCHG RX REV CODE 636 W/ 250 OVERRIDE (IP): Performed by: INTERNAL MEDICINE

## 2021-04-16 PROCEDURE — 85007 BL SMEAR W/DIFF WBC COUNT: CPT

## 2021-04-16 PROCEDURE — 99231 SBSQ HOSP IP/OBS SF/LOW 25: CPT | Mod: 24 | Performed by: INTERNAL MEDICINE

## 2021-04-16 PROCEDURE — 700102 HCHG RX REV CODE 250 W/ 637 OVERRIDE(OP): Performed by: STUDENT IN AN ORGANIZED HEALTH CARE EDUCATION/TRAINING PROGRAM

## 2021-04-16 PROCEDURE — 36415 COLL VENOUS BLD VENIPUNCTURE: CPT

## 2021-04-16 PROCEDURE — 83735 ASSAY OF MAGNESIUM: CPT

## 2021-04-16 PROCEDURE — 84100 ASSAY OF PHOSPHORUS: CPT

## 2021-04-16 PROCEDURE — A9270 NON-COVERED ITEM OR SERVICE: HCPCS | Performed by: STUDENT IN AN ORGANIZED HEALTH CARE EDUCATION/TRAINING PROGRAM

## 2021-04-16 PROCEDURE — 93306 TTE W/DOPPLER COMPLETE: CPT

## 2021-04-16 PROCEDURE — 99239 HOSP IP/OBS DSCHRG MGMT >30: CPT | Mod: GC | Performed by: INTERNAL MEDICINE

## 2021-04-16 PROCEDURE — 93306 TTE W/DOPPLER COMPLETE: CPT | Mod: 26 | Performed by: INTERNAL MEDICINE

## 2021-04-16 PROCEDURE — 85027 COMPLETE CBC AUTOMATED: CPT

## 2021-04-16 RX ORDER — LOSARTAN POTASSIUM 50 MG/1
50 TABLET ORAL
Status: DISCONTINUED | OUTPATIENT
Start: 2021-04-16 | End: 2021-04-16 | Stop reason: HOSPADM

## 2021-04-16 RX ORDER — TAMSULOSIN HYDROCHLORIDE 0.4 MG/1
0.4 CAPSULE ORAL ONCE
Status: COMPLETED | OUTPATIENT
Start: 2021-04-16 | End: 2021-04-16

## 2021-04-16 RX ORDER — TAMSULOSIN HYDROCHLORIDE 0.4 MG/1
0.8 CAPSULE ORAL DAILY
Status: DISCONTINUED | OUTPATIENT
Start: 2021-04-17 | End: 2021-04-16 | Stop reason: HOSPADM

## 2021-04-16 RX ORDER — ATORVASTATIN CALCIUM 20 MG/1
20 TABLET, FILM COATED ORAL DAILY
Qty: 30 TABLET | Refills: 0 | Status: SHIPPED
Start: 2021-04-16 | End: 2021-04-16 | Stop reason: SDUPTHER

## 2021-04-16 RX ORDER — ATORVASTATIN CALCIUM 20 MG/1
20 TABLET, FILM COATED ORAL DAILY
Qty: 30 TABLET | Refills: 0 | Status: SHIPPED | OUTPATIENT
Start: 2021-04-16

## 2021-04-16 RX ORDER — POTASSIUM CHLORIDE 20 MEQ/1
40 TABLET, EXTENDED RELEASE ORAL ONCE
Status: COMPLETED | OUTPATIENT
Start: 2021-04-16 | End: 2021-04-16

## 2021-04-16 RX ORDER — LOSARTAN POTASSIUM 50 MG/1
50 TABLET ORAL DAILY
Qty: 30 TABLET | Refills: 0 | Status: SHIPPED
Start: 2021-04-16 | End: 2021-04-16 | Stop reason: SDUPTHER

## 2021-04-16 RX ORDER — LOSARTAN POTASSIUM 50 MG/1
50 TABLET ORAL DAILY
Qty: 30 TABLET | Refills: 0 | Status: SHIPPED | OUTPATIENT
Start: 2021-04-16

## 2021-04-16 RX ORDER — LOSARTAN POTASSIUM 50 MG/1
50 TABLET ORAL DAILY
Qty: 30 TABLET | Refills: 0 | Status: SHIPPED | OUTPATIENT
Start: 2021-04-16 | End: 2021-04-16 | Stop reason: SDUPTHER

## 2021-04-16 RX ORDER — ATORVASTATIN CALCIUM 20 MG/1
20 TABLET, FILM COATED ORAL DAILY
Qty: 30 TABLET | Refills: 0 | Status: SHIPPED | OUTPATIENT
Start: 2021-04-16 | End: 2021-04-16 | Stop reason: SDUPTHER

## 2021-04-16 RX ADMIN — Medication 2 TABLET: at 05:04

## 2021-04-16 RX ADMIN — CEFAZOLIN SODIUM 2 G: 2 INJECTION, SOLUTION INTRAVENOUS at 00:26

## 2021-04-16 RX ADMIN — TAMSULOSIN HYDROCHLORIDE 0.4 MG: 0.4 CAPSULE ORAL at 07:38

## 2021-04-16 RX ADMIN — FINASTERIDE 5 MG: 5 TABLET, FILM COATED ORAL at 05:02

## 2021-04-16 RX ADMIN — Medication 1 TABLET: at 05:02

## 2021-04-16 RX ADMIN — DOCUSATE SODIUM 50 MG AND SENNOSIDES 8.6 MG 2 TABLET: 8.6; 5 TABLET, FILM COATED ORAL at 05:06

## 2021-04-16 RX ADMIN — TAMSULOSIN HYDROCHLORIDE 0.4 MG: 0.4 CAPSULE ORAL at 05:06

## 2021-04-16 RX ADMIN — CEFAZOLIN SODIUM 2 G: 2 INJECTION, SOLUTION INTRAVENOUS at 07:39

## 2021-04-16 RX ADMIN — ENOXAPARIN SODIUM 40 MG: 40 INJECTION SUBCUTANEOUS at 07:38

## 2021-04-16 RX ADMIN — POTASSIUM CHLORIDE 40 MEQ: 1500 TABLET, EXTENDED RELEASE ORAL at 07:39

## 2021-04-16 RX ADMIN — ASPIRIN 81 MG: 81 TABLET, COATED ORAL at 05:03

## 2021-04-16 RX ADMIN — OMEPRAZOLE 20 MG: 20 CAPSULE, DELAYED RELEASE ORAL at 05:02

## 2021-04-16 RX ADMIN — HUMAN ALBUMIN MICROSPHERES AND PERFLUTREN 3 ML: 10; .22 INJECTION, SOLUTION INTRAVENOUS at 11:02

## 2021-04-16 ASSESSMENT — ENCOUNTER SYMPTOMS
PSYCHIATRIC NEGATIVE: 1
CHILLS: 0
CARDIOVASCULAR NEGATIVE: 1
FATIGUE: 0
LIGHT-HEADEDNESS: 0
DIAPHORESIS: 0
CHEST TIGHTNESS: 0
COUGH: 0
RESPIRATORY NEGATIVE: 1
DIZZINESS: 0
STRIDOR: 0
ACTIVITY CHANGE: 0
COLOR CHANGE: 0
WHEEZING: 0
SHORTNESS OF BREATH: 0
PALPITATIONS: 0
FEVER: 0
ABDOMINAL PAIN: 0
GASTROINTESTINAL NEGATIVE: 1

## 2021-04-16 ASSESSMENT — FIBROSIS 4 INDEX: FIB4 SCORE: 3.26

## 2021-04-16 NOTE — DISCHARGE SUMMARY
Discharge Summary    CHIEF COMPLAINT ON ADMISSION  Chief Complaint   Patient presents with   • Irregular Heart Beat     3rd degree   • Urinary retention       Reason for Admission  Third degree heart block  Bradycardia    Admission Date  4/15/2021    CODE STATUS  Full Code    HPI & HOSPITAL COURSE  86 year old very pleasant  was brought to the [facility] Emergency Department on 04/15/2021 from [facility] for third degree heart block. The patient was incidentally found to be bradycardic on routine clinic vitals prompting EKG which depicted a complete third degree heart block. The patient was fortunately asymptomatic. EMS was dispatched from clinic and the patient was promptly transported to [facility] ED for urgent cardiac evaluation. The patient on arrival to [facility] was bradycardic to the low 30s w/ third degree heart block confirmed on repeat EKG. He was otherwise hemodynamically/symptomatically stable. The patient was seen by cardiology in the emergency department and taken to the operating room by the electrophysiologist for emergent pacemaker placement. The patient tolerated this procedure well. He was observed on the medical quintana on telemetry and had no arrhythmias overnight. He was however retaining urine considerable and a christine catheter had to be inserted. The following morning on 04/16/2021, the patient was seated comfortably in bed without chest pain, fevers, chills, shortness of breath, nausea, or vomiting. He tolerated a regular diet. His pacemaker was interrogated and found to be functional well. A transthoracic echocardiogram was obtained and demonstrated a reduced ejection fraction of 45% consistent w/ HFrEF. The patient was prescribed Losartan and switched to high potency Atorvastatin from Simvastatin. At this time, he is stable for discharge home with scheduled outpatient follow up with cardiology and his PCP. His christine catheter was left in place at the time of discharge and the patient was instructed  to [facility] for voiding trial.    Discharge Date  4/16/2021    Brief Interval History  No acute events overnight. Denies fevers, chills, shortness of breath, and chest pain. Significant urinary retention. Scott catheter inserted. Pacemaker interrogated and functioning well.     Physical Exam on Day of Discharge  Physical Exam   Constitutional: He is oriented to person, place, and time and well-developed, well-nourished, and in no distress. No distress.   HENT:   Head: Normocephalic and atraumatic.   Eyes: Pupils are equal, round, and reactive to light. EOM are normal.   Neck: No JVD present.   Cardiovascular: Regular rhythm, normal heart sounds and intact distal pulses.   Pulmonary/Chest: Effort normal and breath sounds normal.   Pacemaker insertion site C/D/I, outline of pacemaker visible under skin   Genitourinary:    Genitourinary Comments: Scott catheter in place     Musculoskeletal:         General: No deformity or edema. Normal range of motion.      Cervical back: Normal range of motion and neck supple.   Neurological: He is alert and oriented to person, place, and time. GCS score is 15.   Skin: Skin is warm and dry. He is not diaphoretic.   Psychiatric: Mood and affect normal.       FOLLOW UP ITEMS POST DISCHARGE  PCP to follow up with voiding trial, urinary retention, and titration of Flomax dose  PCP to follow up with hypertension and initiation of appropriate antihypertensive therapy    DISCHARGE DIAGNOSES  Principal Problem:    Third degree heart block (HCC) POA: Yes  Active Problems:    Hypertensive urgency POA: Yes    Hyperlipidemia (Chronic) POA: Yes    BPH (benign prostatic hyperplasia) (Chronic) POA: Yes    Peripheral artery disease (HCC) (Chronic) POA: Yes    GERD (gastroesophageal reflux disease) POA: Unknown    FOLLOW UP  Future Appointments   Date Time Provider Department Center   4/26/2021 10:00 AM [facility] None     MEDICATIONS ON  DISCHARGE     Medication List      START taking these medications      Instructions   atorvastatin 20 MG Tabs  Commonly known as: LIPITOR   Take 1 tablet by mouth every day.  Dose: 20 mg     losartan 50 MG Tabs  Commonly known as: COZAAR   Take 1 tablet by mouth every day.  Dose: 50 mg        CONTINUE taking these medications      Instructions   aspirin EC 81 MG Tbec  Commonly known as: ECOTRIN   Take 81 mg by mouth every day.  Dose: 81 mg     Calcium 500/D 500-200 MG-UNIT Tabs  Generic drug: calcium carbonate-vitamin D   Take 1 tablet by mouth every day.  Dose: 1 tablet     dorzolamide-timolol 22.3-6.8 MG/ML Soln  Commonly known as: COSOPT   Administer 1 Drop into both eyes 2 times a day.  Dose: 1 Drop     finasteride 5 MG Tabs  Commonly known as: PROSCAR   Take 5 mg by mouth every day.  Dose: 5 mg     latanoprost 0.005 % Soln  Commonly known as: XALATAN   Administer 1 Drop into both eyes every evening.  Dose: 1 Drop     melatonin 3 MG Tabs   Take 3 mg by mouth at bedtime.  Dose: 3 mg     omeprazole 20 MG delayed-release capsule  Commonly known as: PRILOSEC   Take 20 mg by mouth every day.  Dose: 20 mg     PSYLLIUM PO   Take 1 Dose by mouth every day.  Dose: 1 Dose     tamsulosin 0.4 MG capsule  Commonly known as: FLOMAX   Take 0.4 mg by mouth every day.  Dose: 0.4 mg     therapeutic multivitamin-minerals Tabs   Take 1 tablet by mouth every day.  Dose: 1 tablet        STOP taking these medications    simvastatin 5 MG Tabs  Commonly known as: ZOCOR            Allergies  Allergies   Allergen Reactions   • Gabapentin    • Lisinopril        DIET  Orders Placed This Encounter   Procedures   • Diet Order Diet: Cardiac     Standing Status:   Standing     Number of Occurrences:   1     Order Specific Question:   Diet:     Answer:   Cardiac [6]   • Discontinue Diet Tray     Standing Status:   Standing     Number of Occurrences:   1     TRANSTHORACIC ECHOCARDIOGRAM 04/16/2021   CONCLUSIONS  Left ventricular ejection fraction  is visually estimated to be 45%.  Septal bounce noted.  Inferior wall hypokinesis.  Normal pericardium without effusion.     WILL GARCIA  Exam Date:         2021                      08:32  Exam Location:     Inpatient  Priority:          Routine     Ordering Physician:        ARTURO NICOLAS  Referring Physician:  Sonographer:               Will Isidro RDCS     Age:    86     Gender:    M  MRN:    [id number]  :    1935  BSA:    1.92   Ht (in):    72     Wt (lb):    157  Exam Type:     Complete, Contrast     Indications:     Bradycardia  ICD Codes:       427.89     CPT Codes:       56464,      BP:   193    /   60     HR:   60  Technical Quality:       Technically difficult study -                            adequate information is obtained     MEASUREMENTS  (Male / Female) Normal Values  2D ECHO  LV Diastolic Diameter PLAX        4.8 cm                4.2 - 5.9 / 3.9 - 5.3   cm  LV Systolic Diameter PLAX         3.1 cm                2.1 - 4.0 cm  IVS Diastolic Thickness           1 cm                    LVPW Diastolic Thickness          0.88 cm                 LVOT Diameter                     2.1 cm                  Estimated LV Ejection Fraction    45 %                    LV Ejection Fraction MOD 4C       60.2 %                     DOPPLER  AV Peak Velocity                  1.4 m/s                 AV Peak Gradient                  7.8 mmHg                AV Mean Gradient                  3.7 mmHg                LVOT Peak Velocity                0.97 m/s                AV Area Cont Eq vti               2.7 cm2                 Mitral E Point Velocity           0.49 m/s                Mitral E to A Ratio               0.68                    Mitral A Duration                 128 ms                  MV Pressure Half Time             76.5 ms                 MV Area PHT                       2.9 cm2                 MV Deceleration Time              264 ms                     * Indicates values  subject to auto-interpretation  LV EF:  45    %     FINDINGS  Left Ventricle  Normal left ventricular chamber size. Normal left ventricular wall   thickness. Normal left ventricular systolic function. Left ventricular   ejection fraction is visually estimated to be 45%. Septal bounce noted.   Inferior wall hypokinesis. Grade I diastolic dysfunction.     Right Ventricle  Normal right ventricular size and systolic function.     Right Atrium  Normal right atrial size. Vena cava is not well visualized.     Left Atrium  Normal left atrial size. Left atrial volume index is 21  mL/sq m.     Mitral Valve  Structurally normal mitral valve. No mitral stenosis. Trace mitral   regurgitation.     Aortic Valve  Structurally normal aortic valve without significant stenosis or   regurgitation.     Tricuspid Valve  Structurally normal tricuspid valve without significant stenosis or   regurgitation. Unable to estimate pulmonary artery pressure due to an   inadequate tricuspid regurgitant jet.     Pulmonic Valve  Structurally normal pulmonic valve without significant stenosis or   regurgitation.     Pericardium  Normal pericardium without effusion.     Aorta  Normal aortic root for body surface area. Ascending aorta not well   visualized.         ACTIVITY  As tolerated.  Weight bearing as tolerated   LEFT arm in immobilizer per cardiology recommendations for 6 weeks    CONSULTATIONS  Cardiology    PROCEDURES  Permanent Pacemaker placement 04/15/2021    LABORATORY  Lab Results   Component Value Date    SODIUM 136 04/16/2021    POTASSIUM 3.5 (L) 04/16/2021    CHLORIDE 107 04/16/2021    CO2 22 04/16/2021    GLUCOSE 90 04/16/2021    BUN 16 04/16/2021    CREATININE 0.76 04/16/2021        Lab Results   Component Value Date    WBC 12.3 (H) 04/16/2021    HEMOGLOBIN 12.0 (L) 04/16/2021    HEMATOCRIT 37.3 (L) 04/16/2021    PLATELETCT 173 04/16/2021        Total time of the discharge process exceeds 60 minutes.

## 2021-04-16 NOTE — PROGRESS NOTES
Cardiology Follow Up Progress Note    Date of Service  4/16/2021    Attending Physician  Monty Sepulveda M.D.    Chief Complaint   Complete heart block    HPI  Pablito Ospina is a 86 y.o. male [facility] because of finding of complete heart block on routine physical exam.  Bedside echo revealed EF of about 45%.  A permanent pacemaker was placed yesterday.  Overnight he was noted to have hypertension with blood pressure 156/58 this morning.    I have personally reviewed the echo images and appears that the EF is preserved at greater than 50%.  In reviewing the echocardiographic images.  The tracings used to calculate the ejection fraction were inaccurate.  The EF is normal      Review of Systems  Review of Systems   Constitutional: Negative for activity change.   Respiratory: Negative.    Cardiovascular: Negative.    Gastrointestinal: Negative.    Psychiatric/Behavioral: Negative.        Vital signs in last 24 hours  Temp:  [35.9 °C (96.6 °F)-36.6 °C (97.8 °F)] 36.6 °C (97.8 °F)  Pulse:  [16-63] 60  Resp:  [16-56] 17  BP: (133-199)/(58-94) 156/58  SpO2:  [93 %-100 %] 98 %    Physical Exam  Physical Exam  Constitutional:       General: He is not in acute distress.     Appearance: He is not ill-appearing.   Cardiovascular:      Comments: Examination of the pacemaker site reveals some very minimal drainage on the bandage.  No evidence of hematoma or significant drainage at all.  Pulmonary:      Effort: Pulmonary effort is normal. No respiratory distress.   Skin:     General: Skin is warm and dry.   Neurological:      Mental Status: He is alert.   Psychiatric:         Mood and Affect: Mood normal.         Lab Review  Lab Results   Component Value Date/Time    WBC 12.3 (H) 04/16/2021 01:53 AM    RBC 3.84 (L) 04/16/2021 01:53 AM    HEMOGLOBIN 12.0 (L) 04/16/2021 01:53 AM    HEMATOCRIT 37.3 (L) 04/16/2021 01:53 AM    MCV 97.1 04/16/2021 01:53 AM    MCH 31.3 04/16/2021 01:53 AM    MCHC 32.2 (L) 04/16/2021  01:53 AM    MPV 10.8 04/16/2021 01:53 AM      Lab Results   Component Value Date/Time    SODIUM 136 04/16/2021 01:53 AM    POTASSIUM 3.5 (L) 04/16/2021 01:53 AM    CHLORIDE 107 04/16/2021 01:53 AM    CO2 22 04/16/2021 01:53 AM    GLUCOSE 90 04/16/2021 01:53 AM    BUN 16 04/16/2021 01:53 AM    CREATININE 0.76 04/16/2021 01:53 AM      Lab Results   Component Value Date/Time    ASTSGOT 21 04/16/2021 01:53 AM    ALTSGPT 10 04/16/2021 01:53 AM     Lab Results   Component Value Date/Time    TROPONINT 18 04/15/2021 12:26 PM       No results for input(s): NTPROBNP in the last 72 hours.    Status post permanent pacemaker: [facility] with [facility] block and a pacemaker was placed    Hypertension: Agree with start the patient on an ARB as pressure has been elevated in hospital.  There was some concern about echo results.  I personally viewed the images and feel that the tracings used to calculate EF (symptoms rule) were not drawn accurately.  The EF appears to be preserved and 50% or greater by 2D analysis.  Cardiology will sign off    Please contact me with any questions.    Terrance Perez M.D.   Cardiologist, [facility]  [phone]

## 2021-04-16 NOTE — PROGRESS NOTES
Bedside report received from day shift RN. Patient denies any pain. Communication board updated, call bell within reach.  POC discussed with pt as well as post op pacer education. all questions answered at this time.

## 2021-04-16 NOTE — ED PROVIDER NOTES
ED Provider Note    CHIEF COMPLAINT  Chief Complaint   Patient presents with   • Irregular Heart Beat     3rd degree   • Constipation       HPI  Pablito Ospina is a 86 y.o. male who presents to the emergency department sent from the [facility] due to severe bradycardia.  The patient says that he feels fine, [facility] and was found to have a heart rate of 35 and an EKG was done showing the patient was in a complete heart block.  The patient was sent here for further evaluation.  The patient says that he is really felt just fine over the last week or so, he has had some constipation but he used an over-the-counter medication that did not lead to relief.  He had routine lab work done yesterday in preparation for his office visit today.    REVIEW OF SYSTEMS no chest pain no difficulty breathing he said he does not feel weak or lightheaded or dizzy.  No change in urination.  All other systems negative    PAST MEDICAL HISTORY  History reviewed. No pertinent past medical history.    FAMILY HISTORY  History reviewed. No pertinent family history.    SOCIAL HISTORY  Social History     Socioeconomic History   • Marital status: Single     Spouse name: Not on file   • Number of children: Not on file   • Years of education: Not on file   • Highest education level: Not on file   Occupational History   • Not on file   Tobacco Use   • Smoking status: Never Smoker   • Smokeless tobacco: Never Used   Substance and Sexual Activity   • Alcohol use: Not Currently   • Drug use: Not Currently   • Sexual activity: Not on file   Other Topics Concern   • Not on file   Social History Narrative   • Not on file     Social Determinants of Health     Financial Resource Strain:    • Difficulty of Paying Living Expenses:    Food Insecurity:    • Worried About Running Out of Food in the Last Year:    • Ran Out of Food in the Last Year:    Transportation Needs:    • Lack of Transportation  (Medical):    • Lack of Transportation (Non-Medical):    Physical Activity:    • Days of Exercise per Week:    • Minutes of Exercise per Session:    Stress:    • Feeling of Stress :    Social Connections:    • Frequency of Communication with Friends and Family:    • Frequency of Social Gatherings with Friends and Family:    • Attends Spiritism Services:    • Active Member of Clubs or Organizations:    • Attends Club or Organization Meetings:    • Marital Status:    Intimate Partner Violence:    • Fear of Current or Ex-Partner:    • Emotionally Abused:    • Physically Abused:    • Sexually Abused:        SURGICAL HISTORY  History reviewed. No pertinent surgical history.    CURRENT MEDICATIONS  Home Medications     Reviewed by Eduardo Koo (Pharmacy Tech) on 04/15/21 at 1412  Med List Status: Complete   Medication Last Dose Status   aspirin EC (ECOTRIN) 81 MG Tablet Delayed Response 4/14/2021 Active   calcium carbonate-vitamin D (CALCIUM 500/D) 500-200 MG-UNIT Tab 4/14/2021 Active   dorzolamide-timolol (COSOPT) 22.3-6.8 MG/ML Solution 4/14/2021 Active   finasteride (PROSCAR) 5 MG Tab 4/14/2021 Active   latanoprost (XALATAN) 0.005 % Solution 4/14/2021 Active   melatonin 3 MG Tab 4/14/2021 Active   omeprazole (PRILOSEC) 20 MG delayed-release capsule 4/15/2021 Active   PSYLLIUM PO 4/14/2021 Active   simvastatin (ZOCOR) 5 MG Tab 4/14/2021 Active   tamsulosin (FLOMAX) 0.4 MG capsule 4/14/2021 Active   therapeutic multivitamin-minerals (THERAGRAN-M) Tab 4/14/2021 Active                ALLERGIES  Allergies   Allergen Reactions   • Gabapentin    • Lisinopril        PHYSICAL EXAM  VITAL SIGNS: BP (!) 193/60 Comment: nurse notified  Pulse (!) 38   Temp 36.2 °C (97.1 °F) (Temporal)   Resp 16   Ht 1.829 m (6')   Wt 71.2 kg (157 lb)   SpO2 99%   BMI 21.29 kg/m²    Oxygen saturation is interpreted as adequate  Constitutional: Awake verbal and nontoxic-appearing and in no distress  HENT: Mucous membranes are  moist  Eyes: No erythema discharge or jaundice  Neck: Trachea midline no JVD  Cardiovascular: Regular bradycardia  Lungs: Clear and equal bilaterally with no apparent difficulty breathing  Abdomen/Back: Soft and nondistended no rebound guarding or peritoneal findings  Skin: Warm and dry  Musculoskeletal: No leg edema or calf tenderness  Neurologic: Awake verbal moving all extremities without difficulty    CHART REVIEW  I reviewed the lab tests which [facility] with [facility] these were done yesterday in the morning a CBC showed white blood cell count of 12.39, hemoglobin 13.3 urinalysis was negative for nitrite and leukocyte esterase as well as protein and ketones.  CBC was unremarkable with normal renal function and a potassium of 4.0.  I reviewed an E[facility] which [facility] the [facility] and this showed a regular bradycardia at a rate of 34 bpm and a complete heart block noted based on P wave pattern    Laboratory  Today laboratory testing shows CBC with white blood cell count of 10.9 and hemoglobin 12.7 complete metabolic panel is unremarkable troponin is normal at 18 TSH was normal 2.84 and Covid testing is negative    EKG interpretation  A 12-lead EKG at the time of arrival shows a regular wide-complex bradycardia consistent with third-degree heart block at a rate of 35 bpm    Radiology  DX-CHEST-PORTABLE (1 VIEW)   Final Result      1.  There is no pneumothorax visualized following pacemaker placement.      DX-CHEST-PORTABLE (1 VIEW)   Final Result      Cardiac silhouette enlargement.      No acute abnormality.      CL-PERMANENT PACEMAKER INSERTION    (Results Pending)   EC-ECHOCARDIOGRAM COMPLETE W/O CONT    (Results Pending)     MEDICAL DECISION MAKING and DISPOSITION  In the emergency department an IV was established and the patient was placed on the cardiac monitor, despite his severe bradycardia he has an adequate blood pressure in fact he is hypertensive and he has no symptoms.  I have reviewed the  case with the cardiologist who is seen the patient at the bedside and will plan further evaluation and intervention and I have reviewed the case with the hospitalist the patient is referred to the hospitalist service for further care    IMPRESSION  1.  Third-degree heart block with severe bradycardia         Electronically signed by: Ivan Oreilly M.D., 4/15/2021 5:12 PM

## 2021-04-16 NOTE — PROGRESS NOTES
2 RN Skin Check    2 RN skin check complete.   Devices in place: Nasal Cannula.  Left elbow: purple/red patch of ecchymosis on inner and outer elbow  Toes/ soles of feet: xerosis  Skin assessed under devices: yes.  The following interventions in place Pillows.

## 2021-04-16 NOTE — PROGRESS NOTES
Patient being discharged home. IVs removed. Skin tear to left arm due to adhesive removal. Properly dressed. Telemetry monitor discontinued. Patient paperwork discussed and given to patient. Signed copy with chart. Immobilizer applied to shoulder by ortho tech. Dressing changed to left shoulder. Catheter drainage bag changed to leg bag. Patient changed into own clothing. Belongings gathered. Paper prescriptions given to patient. All questions answered. [facility]. Walked down to entrance by staff.

## 2021-04-16 NOTE — DISCHARGE INSTRUCTIONS
Discharge Instructions  Pacemaker Discharge Instructions/[facility]  1.  No showers for one week; may take sponge bath.  Keep dressing dry & in place until seen at for you follow up visit at the cardiology office.   Report s/s of infection such as warmth/redness/drainage/swelling at site or fever/chills.  2.  No lifting over 10 lbs for six weeks.  3.  Do not raise affected arm above shoulder level or behind head for six weeks.  4.  Avoid excessive pushing, pulling, or twisting for six weeks.  5.  No driving for the first week.  6.  Call our office ([phone]) if you notice any increased swelling, redness, warmth, or drainage at the implant site.  7.  Call our office if you develop fever > 101F or uncontrolled pain.  8.  No MRI's for 6 weeks if you have a MRI compatible device.  9.  No dental work or cleanings for 3 months.  10.  May remove arm sling after one day, but please wear if you have trouble remembering to keep your arm down.  11. Do not place cell phones or mobile devices directly over implanted device.   12. Your follow-up appointments will be done at approximately 1 week, 6 weeks, then every 3-4 months.    Patient will follow up in 1 week at our pacemaker clinic for pacemaker check or sooner if needed. Patient instructed to contact the office with any questions or concerns.     Discharged to home by taxi with self. Discharged via walking, hospital escort: Yes.  Special equipment needed: Not Applicable    Be sure to schedule a follow-up appointment with your primary care doctor or any specialists as instructed.     Discharge Plan:   Diet Plan: Discussed  Activity Level: Discussed  Confirmed Follow up Appointment: Appointment Scheduled  Confirmed Symptoms Management: Discussed  Medication Reconciliation Updated: Yes    I understand that a diet low in cholesterol, fat, and sodium is recommended for good health. Unless I have been given specific instructions below for another diet, I accept this  instruction as my diet prescription.   Other diet: Cardiac    Depression / Suicide Risk    As you are discharged from this [facility] facility, it is important to learn how to keep safe from harming yourself.    Recognize the warning signs:  · Abrupt changes in personality, positive or negative- including increase in energy   · Giving away possessions  · Change in eating patterns- significant weight changes-  positive or negative  · Change in sleeping patterns- unable to sleep or sleeping all the time   · Unwillingness or inability to communicate  · Depression  · Unusual sadness, discouragement and loneliness  · Talk of wanting to die  · Neglect of personal appearance   · Rebelliousness- reckless behavior  · Withdrawal from people/activities they love  · Confusion- inability to concentrate     If you or a loved one observes any of these behaviors or has concerns about self-harm, here's what you can do:  · Talk about it- your feelings and reasons for harming yourself  · Remove any means that you might use to hurt yourself (examples: pills, rope, extension cords, firearm)  · Get professional help from the community (Mental Health, Substance Abuse, psychological counseling)  · Do not be alone:Call your Safe Contact- someone whom you trust who will be there for you.  · Call your local CRISIS HOTLINE 360-9667 or 207-691-7221  · Call your local Children's Mobile Crisis Response Team [place] (454) 576-6702 or www.commercetools  · Call the toll free National Suicide Prevention Hotlines   · National Suicide Prevention Lifeline 042-112-BHSW (6539)  · National Hope Line Network 800-SUICIDE (846-3119)    Dual-Chamber Pacemaker  A pacemaker is a small, lightweight, battery-powered device that is implanted under the skin in the upper chest. Your caregiver may place a pacemaker if your heartbeat is too slow (bradycardia) or if you experience symptoms from a slow heartbeat. A dual-chamber pacemaker has 2 leads  "(electrodes) that are connected in your heart. One lead is placed in the upper chamber of the heart, called the right atrium. The second lead is placed in the lower part of the heart, called the right ventricle. Dual-chamber pacemakers may pace in both the upper chamber and lower chamber. By doing so, correct rhythm and function are often maintained. When the heart rate is too slow, the pacemaker senses the heartbeat and will pace the heart at a programmed rate.  CAUSES   Different conditions can cause a slow heart rate. Some of these can include:  · Sick sinus syndrome. This is a type of slow heart rate where the \"pacemaker\" of the heart does not work very well. It is often related to aging.  · Heart attack (myocardial infarction). This can damage the heart muscle and cause a slow heart beat.  · Heart block. This is a condition where the signal that causes the heart to beat does not communicate very well between the upper chambers of the heart and the lower chambers of the heart.  · Some heart medications that control fast heart rates or other abnormal heart rhythms can also cause a slow heart rate.  SYMPTOMS   A very slow heart rate results in the heart not pumping enough blood to your body. Symptoms of a slow heart rate can include:  · Passing out (fainting).  · Confusion.  · Shortness of breath.  · Tiredness (fatigue).  · Ankle swelling.  · Chest discomfort or pain.  DIAGNOSIS   Tests will be done to look at how your heart works and beats. This can include:  · A physical exam.  · An electrocardiogram (ECG). An ECG records your heart beat on a strip of paper for your caregiver to look at.  · Continuous ECG monitoring:  · Holter monitor or an Event monitor. These devices record your heart rhythm and can be worn for 24 or more hours at a time. Your caregiver can then look at the recorded history of your heartbeat.  · An electrophysiology study. This is a test to study the heart's electrical system. If your heart " "has a disruption in its electrical pathway, a slow heart beat can occur.  PACEMAKER IMPLANTATION  · Do not eat or drink for 6 hours before the procedure or as told by your caregiver.  · Pacemaker implantation usually takes about one hour.  · Your skin on your upper chest will be cleaned with germ-killing soap.  · Sedation will be given through an IV. This will help you relax during the procedure.  · The site of the incision, often just below a collarbone, will be injected with numbing medicine.  · The insulated electrode is inserted through a large vein in your chest. Then, using a special type of X-ray (fluoroscopy), the tip is positioned in the target area of your heart. The end of the pacemaker lead is fixated to your heart by a corkscrew tip or by small \"tines\" (soft anchor hooks).  · The connection between the pacemaker electrode and the heart is checked to ensure optimal contact and placement.  · After your pacemaker is implanted, you will need to stay in the hospital to make sure the pacemaker is working correctly. You will be able to go home when your caregiver feels it is safe for you to do so.  HOME CARE INSTRUCTIONS   · Excessive movement of the arm next to the new pacemaker can cause the electrodes to dislodge. Your caregiver will determine how many days the upper arm should not be moved excessively. It is usually three or more days.  · The incision needs to be kept dry as told by your caregiver. As with any surgery, if the incision becomes swollen, red or pus (yellow or tan drainage) appears, call your caregiver right away.  · Your caregiver may use small strips of tape hold the incision closed. They should be allowed to fall off naturally. Do not pull the strips of tape off.  · Digital cell phones should be kept 12 inches away from the pacemaker. Hold them at the ear on the side opposite of the pacer.  · Never leave a cell phone in a pocket over the pacemaker.  · Avoid strong electro-magnetic fields. " "You will not be able to have an MRI scan because of the strong magnets.  · The pacemaker battery should last several years. The pacemaker needs to be checked at regular intervals as told by your caregiver.  RISKS AND COMPLICATIONS  An implanted pacemaker has risks. Some of these can include:  · Infection.  · The pacemaker electrode can become dislodged. If this should happen, a second surgery would be needed to reposition it.  · During pacemaker implantation, it is possible to puncture the lung. This is a very rare occurrence.  SEEK MEDICAL CARE IF:   · You have dizziness or pass out.  · You feel your heart \"skipping\" beats or feel your heart \"racing.\"  · Hiccups that do not go away.  · You develop redness, swelling or pain at the pacemaker insertion site.  · The pacemaker insertion site has yellow drainage or there is a bad odor coming from the insertion site.  · An unexplained temperature of 102° F (38.9° C) or above develops.  MAKE SURE YOU:   · Understand these instructions.  · Will watch your condition.  · Will get help right away if you are not doing well or get worse.  Document Released: 10/15/2010 Document Revised: 03/11/2013 Document Reviewed: 10/15/2010  [employer]® Patient Information ©2014 [employer].      Indwelling Urinary Catheter Care, Adult  An indwelling urinary catheter is a thin tube that is put into your bladder. The tube helps to drain pee (urine) out of your body. The tube goes in through your urethra. Your urethra is where pee comes out of your body. Your pee will come out through the catheter, then it will go into a bag (drainage bag).  Take good care of your catheter so it will work well.  How to wear your catheter and bag  Supplies needed  · Sticky tape (adhesive tape) or a leg strap.  · Alcohol wipe or soap and water (if you use tape).  · A clean towel (if you use tape).  · Large overnight bag.  · Smaller bag (leg bag).  Wearing your catheter  Attach your catheter to your leg with tape " or a leg strap.  · Make sure the catheter is not pulled tight.  · If a leg strap gets wet, take it off and put on a dry strap.  · If you use tape to hold the bag on your le. Use an alcohol wipe or soap and water to wash your skin where the tape made it sticky before.  2. Use a clean towel to pat-dry that skin.  3. Use new tape to make the bag stay on your leg.  Wearing your bags  You should have been given a large overnight bag.  · You may wear the overnight bag in the day or night.  · Always have the overnight bag lower than your bladder.  Do not let the bag touch the floor.  · Before you go to sleep, put a clean plastic bag in a wastebasket. Then hang the overnight bag inside the wastebasket.  You should also have a smaller leg bag that fits under your clothes.  · Always wear the leg bag below your knee.  · Do not wear your leg bag at night.  How to care for your skin and catheter  Supplies needed  · A clean washcloth.  · Water and mild soap.  · A clean towel.  Caring for your skin and catheter         · Clean the skin around your catheter every day:  ? Wash your hands with soap and water.  ? Wet a clean washcloth in warm water and mild soap.  ? Clean the skin around your urethra.  ? If you are female:  ? Gently spread the folds of skin around your vagina (labia).  ? With the washcloth in your other hand, wipe the inner side of your labia on each side. Wipe from front to back.  ? If you are male:  ? Pull back any skin that covers the end of your penis (foreskin).  ? With the washcloth in your other hand, wipe your penis in small circles. Start wiping at the tip of your penis, then move away from the catheter.  ? Move the foreskin back in place, if needed.  ? With your free hand, hold the catheter close to where it goes into your body.  ? Keep holding the catheter during cleaning so it does not get pulled out.  ? With the washcloth in your other hand, clean the catheter.  ? Only wipe downward on the  catheter.  ? Do not wipe upward toward your body. Doing this may push germs into your urethra and cause infection.  ? Use a clean towel to pat-dry the catheter and the skin around it. Make sure to wipe off all soap.  ? Wash your hands with soap and water.  · Shower every day. Do not take baths.  · Do not use cream, ointment, or lotion on the area where the catheter goes into your body, unless your doctor tells you to.  · Do not use powders, sprays, or lotions on your genital area.  · Check your skin around the catheter every day for signs of infection. Check for:  ? Redness, swelling, or pain.  ? Fluid or blood.  ? Warmth.  ? Pus or a bad smell.  How to empty the bag  Supplies needed  · Rubbing alcohol.  · Gauze pad or cotton ball.  · Tape or a leg strap.  Emptying the bag  Pour the pee out of your bag when it is ?-½ full, or at least 2-3 times a day. Do this for your overnight bag and your leg bag.  1. Wash your hands with soap and water.  2. Separate (detach) the bag from your leg.  3. Hold the bag over the toilet or a clean pail. Keep the bag lower than your hips and bladder. This is so the pee (urine) does not go back into the tube.  4. Open the pour spout. It is at the bottom of the bag.  5. Empty the pee into the toilet or pail. Do not let the pour spout touch any surface.  6. Put rubbing alcohol on a gauze pad or cotton ball.  7. Use the gauze pad or cotton ball to clean the pour spout.  8. Close the pour spout.  9. Attach the bag to your leg with tape or a leg strap.  10. Wash your hands with soap and water.  Follow instructions for cleaning the drainage bag:  · From the product maker.  · As told by your doctor.  How to change the bag  Supplies needed  · Alcohol wipes.  · A clean bag.  · Tape or a leg strap.  Changing the bag  Replace your bag when it starts to leak, smell bad, or look dirty.  1. Wash your hands with soap and water.  2. Separate the dirty bag from your leg.  3. Pinch the catheter with your  fingers so that pee does not spill out.  4. Separate the catheter tube from the bag tube where these tubes connect (at the connection valve). Do not let the tubes touch any surface.  5. Clean the end of the catheter tube with an alcohol wipe. Use a different alcohol wipe to clean the end of the bag tube.  6. Connect the catheter tube to the tube of the clean bag.  7. Attach the clean bag to your leg with tape or a leg strap. Do not make the bag tight on your leg.  8. Wash your hands with soap and water.  General rules    · Never pull on your catheter. Never try to take it out. Doing that can hurt you.  · Always wash your hands before and after you touch your catheter or bag. Use a mild, fragrance-free soap. If you do not have soap and water, use hand .  · Always make sure there are no twists or bends (kinks) in the catheter tube.  · Always make sure there are no leaks in the catheter or bag.  · Drink enough fluid to keep your pee pale yellow.  · Do not take baths, swim, or use a hot tub.  · If you are female, wipe from front to back after you poop (have a bowel movement).  Contact a doctor if:  · Your pee is cloudy.  · Your pee smells worse than usual.  · Your catheter gets clogged.  · Your catheter leaks.  · Your bladder feels full.  Get help right away if:  · You have redness, swelling, or pain where the catheter goes into your body.  · You have fluid, blood, pus, or a bad smell coming from the area where the catheter goes into your body.  · Your skin feels warm where the catheter goes into your body.  · You have a fever.  · You have pain in your:  ? Belly (abdomen).  ? Legs.  ? Lower back.  ? Bladder.  · You see blood in the catheter.  · Your pee is pink or red.  · You feel sick to your stomach (nauseous).  · You throw up (vomit).  · You have chills.  · Your pee is not draining into the bag.  · Your catheter gets pulled out.  Summary  · An indwelling urinary catheter is a thin tube that is placed into  the bladder to help drain pee (urine) out of the body.  · The catheter is placed into the part of the body that drains pee from the bladder (urethra).  · Taking good care of your catheter will keep it working properly and help prevent problems.  · Always wash your hands before and after touching your catheter or bag.  · Never pull on your catheter or try to take it out.  This information is not intended to replace advice given to you by your health care provider. Make sure you discuss any questions you have with your health care provider.  Document Released: 04/14/2014 Document Revised: 04/10/2020 Document Reviewed: 08/03/2018  [employer] Patient Education © 2020 [employer]    Acute Urinary Retention, Male    Acute urinary retention means that you cannot pee (urinate) at all, or that you pee too little and your bladder is not emptied completely. If it is not treated, it can lead to kidney damage or other serious problems.  Follow these instructions at home:  · Take over-the-counter and prescription medicines only as told by your doctor. Ask your doctor what medicines you should stay away from. Do not take any medicine unless your doctor says it is okay to do so.  · If you were sent home with a tube that drains the bladder (catheter), take care of it as told by your doctor.  · Drink enough fluid to keep your pee clear or pale yellow.  · If you were given an antibiotic, take it as told by your doctor. Do not stop taking the antibiotic even if you start to feel better.  · Do not use any products that contain nicotine or tobacco, such as cigarettes and e-cigarettes. If you need help quitting, ask your doctor.  · Watch for changes in your symptoms. Tell your doctor about them.  · If told, track changes in your blood pressure at home. Tell your doctor about them.  · Keep all follow-up visits as told by your doctor. This is important.  Contact a doctor if:  · You have spasms or you leak pee when you have spasms.  Get help  right away if:  · You have chills or a fever.  · You have a tube that drains the bladder and:  ? The tube stops draining pee.  ? The tube falls out.  · You have blood in your pee.  Summary  · Acute urinary retention means that you have problems peeing. It may mean that you cannot pee at all, or that you pee too little.  · If this condition is not treated, it can lead to kidney damage or other serious problems.  · If you were sent home with a tube that drains the bladder, take care of it as told by your doctor.  · Monitor any changes in your symptoms. Tell your doctor about any changes.  This information is not intended to replace advice given to you by your health care provider. Make sure you discuss any questions you have with your health care provider.  Document Released: 06/05/2009 Document Revised: 03/05/2020 Document Reviewed: 01/19/2018  [employer] Patient Education © 2020 [employer]

## 2021-04-16 NOTE — PROGRESS NOTES
Cardiology Follow Up Progress Note    Date of Service  4/16/2021    Attending Physician  Monty Sepulveda M.D.    Chief Complaint   Bradycardia; CHB    HPI  Pablito Ospina is a 86 y.o. male admitted 4/15/2021 with as transfer from the [facility] for complete heart block. [facility] and was found to be bradycardic at his PCP wellness visit. Relatively asymptomatic. Denies chest pain, shortness or breath, presyncope or syncope. Reports some fatigue. EKG shows complete heart block. Not on rodolfo blocking agents. Euthyroid. Echocardiogram pending. Recommended for permanent pacemaker implantation.       He underwent successful Medtronic dual-chamber pacemaker implantation by Dr. Brumfield on 04/15/21.     Medical history significant for PVD, BPH, HDL, prior smoker.     Interim Events  04/16/2021: Seen in EP Rounds  S/P PPM  Denies Cardiac complaints at this time. Mild tenderness at device site. Ice pack provided.   Weight stable 154 lbs.   COVID negative (4/15/21)  TSH 2.840  Vital Signs/Labs: Were reviewed. Afebrile, BP stable.   Telemetry monitor: sinus rhythm/paced rhythm 60-70s bpm.  No significant events overnight.     Review of Systems  Review of Systems   Constitutional: Negative for chills, diaphoresis, fatigue and fever.   Respiratory: Negative for cough, chest tightness, shortness of breath, wheezing and stridor.    Cardiovascular: Negative for chest pain, palpitations and leg swelling.   Gastrointestinal: Negative for abdominal pain.   Genitourinary: Negative for difficulty urinating and hematuria.   Skin: Negative for color change.   Neurological: Negative for dizziness and light-headedness.     Vital signs in last 24 hours  Temp:  [35.9 °C (96.6 °F)-36.6 °C (97.8 °F)] 36.6 °C (97.8 °F)  Pulse:  [16-63] 60  Resp:  [16-56] 17  BP: (133-200)/(58-94) 156/58  SpO2:  [93 %-100 %] 98 %    Physical Exam  Physical Exam   Constitutional: He is oriented to person, place, and time and well-developed,  well-nourished, and in no distress. No distress.   Cardiovascular: Normal rate, regular rhythm and intact distal pulses.   Left upper chest device site pressure dressing removedc and new dressing placed. Site CDI with tegaderm/gauze, no evidence of active bleeding, edema, erythema, or hematoma present.    Pulmonary/Chest: Effort normal and breath sounds normal. No respiratory distress. He has no wheezes. He has no rales.   Musculoskeletal:         General: No edema. Normal range of motion.   Neurological: He is alert and oriented to person, place, and time.   Skin: Skin is warm and dry. He is not diaphoretic. No erythema.   Vitals reviewed.    Lab Review  Lab Results   Component Value Date/Time    WBC 12.3 (H) 04/16/2021 01:53 AM    RBC 3.84 (L) 04/16/2021 01:53 AM    HEMOGLOBIN 12.0 (L) 04/16/2021 01:53 AM    HEMATOCRIT 37.3 (L) 04/16/2021 01:53 AM    MCV 97.1 04/16/2021 01:53 AM    MCH 31.3 04/16/2021 01:53 AM    MCHC 32.2 (L) 04/16/2021 01:53 AM    MPV 10.8 04/16/2021 01:53 AM      Lab Results   Component Value Date/Time    SODIUM 136 04/16/2021 01:53 AM    POTASSIUM 3.5 (L) 04/16/2021 01:53 AM    CHLORIDE 107 04/16/2021 01:53 AM    CO2 22 04/16/2021 01:53 AM    GLUCOSE 90 04/16/2021 01:53 AM    BUN 16 04/16/2021 01:53 AM    CREATININE 0.76 04/16/2021 01:53 AM      Lab Results   Component Value Date/Time    ASTSGOT 21 04/16/2021 01:53 AM    ALTSGPT 10 04/16/2021 01:53 AM     No results found for: CHOLSTRLTOT, LDL, HDL, TRIGLYCERIDE, TROPONINI        Cardiac Imaging and Procedures Review  EKG 04/16/2021:  AV paced    Echocardiogram 04/16/2021:   No prior study is available for comparison.   Left ventricular ejection fraction is visually estimated to be 45%.  Septal bounce noted.  Inferior wall hypokinesis.  Normal pericardium without effusion.    Imaging  Chest X-Ray 04/16/2021:    FINDINGS:   Cardiomediastinal silhouette is stable. Aortic calcified atherosclerotic plaque. There is a dual-lead cardiac  pacemaker.  No focal consolidation, pleural effusion, pulmonary edema or pneumothorax. Mild linear left basilar atelectasis.  No acute osseous abnormality.  IMPRESSION:  No acute cardiopulmonary abnormality.  No evidence of pneumothorax status post left cardiac pacer placement.    Procedural Conclusions per Dr. Brumfield's Op note (04/15/2021):  [facility]   PROCEDURE PERFORMED: Permanent Pacemaker Implantation  DATE OF SERVICE: 4/15/2021  : Arlyn Brumfield MD  INDICATION(S):  Complete heart block  COMPLICATION(S): None  IMPLANTED DEVICE INFORMATION:  Pulse generator is a MDT model W3DR01  Serial # [id number]  LEAD INFORMATION:  1)Right atrial lead is a MDT model #5076-52, serial #[id number] ,P wave 1.9 millivolts, threshold 0.5 Volts at 0.5 milliseconds, pacing impedance 627 Ohms.  2)Right ventricular lead is a MDT model #5076-58 , serial #[id number] ,R wave PACED millivolts, threshold 0.5 Volts at 0.5 milliseconds, pacing impedance 589 Ohms.  DEVICE PROGRAMMING:  DDDR 60 -130 ppm  FLUOROSCOPY TIME: 2.8 minutes  IMPRESSIONS:  1. Successful dual chamber pacemaker implantation    Assessment/Plan  1. Complete Heart Block S/P Permanent Pacemaker  - S/P successful Medtronic dual-chamber pacemaker implantation by Dr. Brumfield on 04/15/21.   - CXR shows no late signs of PTX, leads appear to be in correct placement.   - EKG and telemetry monitor shows sinus/paced rhythm.  - Device interrogation today shows normal sensing and function. No arrhythmias. May need rate response as outpatient, monitor.   - Left upper pacemaker site CDI, uncomplicated.    - Discussed pacemaker discharge education and care with patient at bedside per below. All pt questions/concerns were answered, patient verbalized understanding. Will you education reinforcement as outpatient.   - Echo shows decreased LVEF 45% with inferior wall hypokinesis, and diastolic dysfunction. Discussed with Dr. Perez, HF my be r/t CHB, will treat HTN with ACE/ARB and start  losartan 50 mg QD and reevaluate LVEF with repeat echo 3-4 weeks as outpatient. Discussed possible allergy to ACE, denies anaphylaxis or cough, does not remember S/E. If trouble tolerating, can switch to coreg.      - [facility] Device card/book provided to patient   - Upper arm immobilizer PRN (apply upper arm strap only) to help prevent lead dislodgement. Ice pack and incentive spirometer provided.   - Patient will follow up with pacemaker clinic in 1 week for pacemaker check and then with [facility].   - Discussed plan with Primary Team, Primary Cardiology, and RN.     Pacemaker Discharge Instructions/[facility]  1.  No showers for one week; may take sponge bath.  Keep dressing dry & in place until seen at for you follow up visit at the cardiology office.   Report s/s of infection such as warmth/redness/drainage/swelling at site or fever/chills.  2.  No lifting over 10 lbs for six weeks.  3.  Do not raise affected arm above shoulder level or behind head for six weeks.  4.  Avoid excessive pushing, pulling, or twisting for six weeks.  5.  No driving for the first week.  6.  Call our office ([phone]) if you notice any increased swelling, redness, warmth, or drainage at the implant site.  7.  Call our office if you develop fever > 101F or uncontrolled pain.  8.  No MRI's for 6 weeks if you have a MRI compatible device.  9.  No dental work or cleanings for 3 months.  10.  May remove arm sling after one day, but please wear if you have trouble remembering to keep your arm down.  11. Do not place cell phones or mobile devices directly over implanted device.   12. Your follow-up appointments will be done at approximately 1 week, 6 weeks, then every 3-4 months.    Patient will follow up in 1 week at our pacemaker clinic for pacemaker check or sooner if needed. Patient instructed to contact the office with any questions or concerns. Thank you for allowing me to participate in the care  of this patient. EP will sign off on this patient.     Future Appointments   Date Time Provider Department Center   4/26/2021 10:00 AM AURELIA Hopkins None     Please contact me with any questions. EP will sign off of this patient.     PHILIPPE Chandra   [facility]  [phone]

## 2021-04-26 ENCOUNTER — NON-PROVIDER VISIT (OUTPATIENT)
Dept: CARDIOLOGY | Facility: MEDICAL CENTER | Age: 86
End: 2021-04-26
Payer: COMMERCIAL

## 2021-04-26 VITALS
BODY MASS INDEX: 20.83 KG/M2 | HEIGHT: 72 IN | DIASTOLIC BLOOD PRESSURE: 60 MMHG | HEART RATE: 60 BPM | SYSTOLIC BLOOD PRESSURE: 122 MMHG | WEIGHT: 153.8 LBS | OXYGEN SATURATION: 96 %

## 2021-04-26 DIAGNOSIS — I44.2 THIRD DEGREE HEART BLOCK (HCC): ICD-10-CM

## 2021-04-26 DIAGNOSIS — Z95.0 CARDIAC PACEMAKER IN SITU: ICD-10-CM

## 2021-04-26 DIAGNOSIS — I48.0 PAROXYSMAL ATRIAL FIBRILLATION (HCC): ICD-10-CM

## 2021-04-26 DIAGNOSIS — Z79.01 CHRONIC ANTICOAGULATION: ICD-10-CM

## 2021-04-26 PROCEDURE — 93280 PM DEVICE PROGR EVAL DUAL: CPT | Performed by: NURSE PRACTITIONER

## 2021-04-26 RX ORDER — AMOXICILLIN 500 MG/1
CAPSULE ORAL
COMMUNITY
Start: 2021-03-24

## 2021-04-26 RX ORDER — HYDROCODONE BITARTRATE AND ACETAMINOPHEN 5; 325 MG/1; MG/1
TABLET ORAL
COMMUNITY
Start: 2021-03-24 | End: 2021-04-26

## 2021-04-26 ASSESSMENT — FIBROSIS 4 INDEX: FIB4 SCORE: 3.26

## 2021-04-26 NOTE — LETTER
[facility]   [address]   [address]  [place] [address]  Phone: [phone]  Fax: [phone]              Pablito Ospina  1935    Encounter Date: 4/26/2021    AURELIA Hopkins          PROGRESS NOTE:  Patient was transferred from the [facility] on 4/15/2021 to [facility], as EKG showed complete AV block.    PM was implanted on 4/15/2021, and he is here today for wound check and device interrogation.    Device is working normally. 128 mode switching episodes (average 1.7 hours per day, 7% of total time); he is on ASA only.  Normal sensing of RA lead; unable to measure R waves. Stable capture of RA and RV leads; stable impedances. Battery longevity is 10.8 years.  No changes are made today.    Incision is well healed with no redness, swelling or drainage noted.    He is reminded about limited ROM of left arm; he is also given wound care instructions, general device information and anticipatory guidance.    He is [id number] score 4 (age, HTN, PAD) and should stop his ASA, and start Xarelto 20mg. [facility] He does see his P[facility] there tomorrow, and is given written instructions regarding the above.    FU in 1 month for next PM check with me.          No Recipients

## 2021-04-26 NOTE — PROGRESS NOTES
Patient was transferred from the [facility] on 4/15/2021 to [facility], as EKG showed complete AV block.    PM was implanted on 4/15/2021, and he is here today for wound check and device interrogation.    Device is working normally. 128 mode switching episodes (average 1.7 hours per day, 7% of total time); he is on ASA only.  Normal sensing of RA lead; unable to measure R waves. Stable capture of RA and RV leads; stable impedances. Battery longevity is 10.8 years.  No changes are made today.    Incision is well healed with no redness, swelling or drainage noted.    He is reminded about limited ROM of left arm; he is also given wound care instructions, general device information and anticipatory guidance.    He is [id number] score 4 (age, HTN, PAD) and should stop his ASA, and start Xarelto 20mg. [facility] He does see his PCP there tomorrow, and is given written instructions regarding the above.    FU in 1 month for next PM check with me.

## 2021-04-26 NOTE — PATIENT INSTRUCTIONS
Pacemaker shows Atrial Fibrillation 7% of total time.    Suggest STOPPING Aspirin and STARTING Xarelto 20mg once daily.    Needs follow-up in 1 month for pacemaker check.

## 2024-03-27 ENCOUNTER — APPOINTMENT (RX ONLY)
Dept: URBAN - METROPOLITAN AREA CLINIC 36 | Facility: CLINIC | Age: 89
Setting detail: DERMATOLOGY
End: 2024-03-27

## 2024-03-27 PROBLEM — D04.39 CARCINOMA IN SITU OF SKIN OF OTHER PARTS OF FACE: Status: ACTIVE | Noted: 2024-03-27

## 2024-03-27 PROCEDURE — 17312 MOHS ADDL STAGE: CPT

## 2024-03-27 PROCEDURE — 17311 MOHS 1 STAGE H/N/HF/G: CPT

## 2024-03-27 PROCEDURE — 13132 CMPLX RPR F/C/C/M/N/AX/G/H/F: CPT

## 2024-03-27 PROCEDURE — ? MOHS SURGERY

## 2024-03-27 NOTE — PROCEDURE: MOHS SURGERY
Partial Purse String (Simple) Text: Given the location of the defect and the characteristics of the surrounding skin a simple purse string closure was deemed most appropriate.  Undermining was performed circumfirentially around the surgical defect.  A purse string suture was then placed and tightened. Wound tension only allowed a partial closure of the circular defect.
Special Stains Stage 1 - Results: Base On Clearance Noted Above
Cheiloplasty (Less Than 50%) Text: A decision was made to reconstruct the defect with a  cheiloplasty.  The defect was undermined extensively.  Additional obicularis oris muscle was excised with a 15 blade scalpel.  The defect was converted into a full thickness wedge, of less than 50% of the vertical height of the lip, to facilite a better cosmetic result.  Small vessels were then tied off with 5-0 monocyrl. The obicularis oris, superficial fascia, adipose and dermis were then reapproximated.  After the deeper layers were approximated the epidermis was reapproximated with particular care given to realign the vermilion border.
Oculoplastic Surgeon Procedure Text (B): After obtaining clear surgical margins the patient was sent to oculoplastics for surgical repair.  The patient understands they will receive post-surgical care and follow-up from the referring physician's office.
Stage 12: Additional Anesthesia Volume In Cc: 0
Mid-Level Procedure Text (C): After obtaining clear surgical margins the patient was sent to a mid-level provider for surgical repair.  The patient understands they will receive post-surgical care and follow-up from the mid-level provider.
Hemigard Postcare Instructions: The HEMIGARD strips are to remain completely dry for at least 5-7 days.
Stage 6: Additional Anesthesia Type: 1% lidocaine with epinephrine
Intermediate Repair And Graft Additional Text (Will Appearing After The Standard Complex Repair Text): The intermediate repair was not sufficient to completely close the primary defect. The remaining additional defect was repaired with the graft mentioned below.
Quadrant Reporting?: no
Additional Anesthesia Volume In Cc: 6
V-Y Flap Text: The defect edges were debeveled with a #15 scalpel blade.  Given the location of the defect, shape of the defect and the proximity to free margins a V-Y flap was deemed most appropriate.  Using a sterile surgical marker, an appropriate advancement flap was drawn incorporating the defect and placing the expected incisions within the relaxed skin tension lines where possible.    The area thus outlined was incised deep to adipose tissue with a #15 scalpel blade.  The skin margins were undermined to an appropriate distance in all directions utilizing iris scissors.
Consent (Lip)/Introductory Paragraph: The rationale for Mohs was explained to the patient and consent was obtained. The risks, benefits and alternatives to therapy were discussed in detail. Specifically, the risks of lip deformity, changes in the oral aperture, infection, scarring, bleeding, prolonged wound healing, incomplete removal, allergy to anesthesia, nerve injury and recurrence were addressed. Prior to the procedure, the treatment site was clearly identified and confirmed by the patient. All components of Universal Protocol/PAUSE Rule completed.
Double M-Plasty Intermediate Repair Preamble Text (Leave Blank If You Do Not Want): Undermining was performed with blunt dissection.
Manual Repair Warning Statement: We plan on removing the manually selected variable below in favor of our much easier automatic structured text blocks found in the previous tab. We decided to do this to help make the flow better and give you the full power of structured data. Manual selection is never going to be ideal in our platform and I would encourage you to avoid using manual selection from this point on, especially since I will be sunsetting this feature. It is important that you do one of two things with the customized text below. First, you can save all of the text in a word file so you can have it for future reference. Second, transfer the text to the appropriate area in the Library tab. Lastly, if there is a flap or graft type which we do not have you need to let us know right away so I can add it in before the variable is hidden. No need to panic, we plan to give you roughly 6 months to make the change.
Surgical Defect Width In Cm (Optional): 1.3
Detail Level: Detailed
Localized Dermabrasion With Wire Brush Text: The patient was draped in routine manner.  Localized dermabrasion using 3 x 17 mm wire brush was performed in routine manner to papillary dermis. This spot dermabrasion is being performed to complete skin cancer reconstruction. It also will eliminate the other sun damaged precancerous cells that are known to be part of the regional effect of a lifetime's worth of sun exposure. This localized dermabrasion is therapeutic and should not be considered cosmetic in any regard.
Dressing: pressure dressing with telfa
Area L Indication Text: Tumors in this location are included in Area L (trunk and extremities).  Mohs surgery is indicated for larger tumors, or tumors with aggressive histologic features, in these anatomic locations.
Deep Sutures: 4-0 Polysorb
Biopsy Photograph Reviewed: Yes
Referred To Plastics For Closure Text (Leave Blank If You Do Not Want): After obtaining clear surgical margins the patient was sent to plastics for surgical repair.  The patient understands they will receive post-surgical care and follow-up from the referring physician's office.
Otolaryngologist Procedure Text (A): After obtaining clear surgical margins the patient was sent to otolaryngology for surgical repair.  The patient understands they will receive post-surgical care and follow-up from the referring physician's office.
Inflammation Suggestive Of Cancer Camouflage Histology Text: There was a dense lymphocytic infiltrate which prevented adequate histologic evaluation of adjacent structures.
Tumor Depth: Less than 6mm from granular layer and no invasion beyond the subcutaneous fat
O-Z Flap Text: The defect edges were debeveled with a #15 scalpel blade.  Given the location of the defect, shape of the defect and the proximity to free margins an O-Z flap was deemed most appropriate.  Using a sterile surgical marker, an appropriate transposition flap was drawn incorporating the defect and placing the expected incisions within the relaxed skin tension lines where possible. The area thus outlined was incised deep to adipose tissue with a #15 scalpel blade.  The skin margins were undermined to an appropriate distance in all directions utilizing iris scissors.
Mohs Histo Method Verbiage: Each section was then chromacoded and processed in the Mohs lab using the Mohs protocol and submitted for frozen section.
Mustarde Flap Text: The defect edges were debeveled with a #15 scalpel blade.  Given the size, depth and location of the defect and the proximity to free margins a Mustarde flap was deemed most appropriate.  Using a sterile surgical marker, an appropriate flap was drawn incorporating the defect. The area thus outlined was incised with a #15 scalpel blade.  The skin margins were undermined to an appropriate distance in all directions utilizing iris scissors.
O-Z Plasty Text: The defect edges were debeveled with a #15 scalpel blade.  Given the location of the defect, shape of the defect and the proximity to free margins an O-Z plasty (double transposition flap) was deemed most appropriate.  Using a sterile surgical marker, the appropriate transposition flaps were drawn incorporating the defect and placing the expected incisions within the relaxed skin tension lines where possible.    The area thus outlined was incised deep to adipose tissue with a #15 scalpel blade.  The skin margins were undermined to an appropriate distance in all directions utilizing iris scissors.  Hemostasis was achieved with electrocautery.  The flaps were then transposed into place, one clockwise and the other counterclockwise, and anchored with interrupted buried subcutaneous sutures.
Dermal Autograft Text: The defect edges were debeveled with a #15 scalpel blade.  Given the location of the defect, shape of the defect and the proximity to free margins a dermal autograft was deemed most appropriate.  Using a sterile surgical marker, the primary defect shape was transferred to the donor site. The area thus outlined was incised deep to adipose tissue with a #15 scalpel blade.  The harvested graft was then trimmed of adipose and epidermal tissue until only dermis was left.  The skin graft was then placed in the primary defect and oriented appropriately.
Cartilage Graft Text: The defect edges were debeveled with a #15 scalpel blade.  Given the location of the defect, shape of the defect, the fact the defect involved a full thickness cartilage defect a cartilage graft was deemed most appropriate.  An appropriate donor site was identified, cleansed, and anesthetized. The cartilage graft was then harvested and transferred to the recipient site, oriented appropriately and then sutured into place.  The secondary defect was then repaired using a primary closure.
Complex Repair And Graft Additional Text (Will Appearing After The Standard Complex Repair Text): The complex repair was not sufficient to completely close the primary defect. The remaining additional defect was repaired with the graft mentioned below.
Cheiloplasty (Complex) Text: A decision was made to reconstruct the defect with a  cheiloplasty.  The defect was undermined extensively.  Additional obicularis oris muscle was excised with a 15 blade scalpel.  The defect was converted into a full thickness wedge to facilite a better cosmetic result.  Small vessels were then tied off with 5-0 monocyrl. The obicularis oris, superficial fascia, adipose and dermis were then reapproximated.  After the deeper layers were approximated the epidermis was reapproximated with particular care given to realign the vermilion border.
Intermediate Repair And Flap Additional Text (Will Appearing After The Standard Complex Repair Text): The intermediate repair was not sufficient to completely close the primary defect. The remaining additional defect was repaired with the flap mentioned below.
Skin Substitute Text: The defect edges were debeveled with a #15 scalpel blade.  Given the location of the defect, shape of the defect and the proximity to free margins a skin substitute graft was deemed most appropriate.  The graft material was trimmed to fit the size of the defect. The graft was then placed in the primary defect and oriented appropriately.
Island Pedicle Flap With Canthal Suspension Text: The defect edges were debeveled with a #15 scalpel blade.  Given the location of the defect, shape of the defect and the proximity to free margins an island pedicle advancement flap was deemed most appropriate.  Using a sterile surgical marker, an appropriate advancement flap was drawn incorporating the defect, outlining the appropriate donor tissue and placing the expected incisions within the relaxed skin tension lines where possible. The area thus outlined was incised deep to adipose tissue with a #15 scalpel blade.  The skin margins were undermined to an appropriate distance in all directions around the primary defect and laterally outward around the island pedicle utilizing iris scissors.  There was minimal undermining beneath the pedicle flap. A suspension suture was placed in the canthal tendon to prevent tension and prevent ectropion.
Information: Selecting Yes will display possible errors in your note based on the variables you have selected. This validation is only offered as a suggestion for you. PLEASE NOTE THAT THE VALIDATION TEXT WILL BE REMOVED WHEN YOU FINALIZE YOUR NOTE. IF YOU WANT TO FAX A PRELIMINARY NOTE YOU WILL NEED TO TOGGLE THIS TO 'NO' IF YOU DO NOT WANT IT IN YOUR FAXED NOTE.
Posterior Auricular Interpolation Flap Text: A decision was made to reconstruct the defect utilizing an interpolation axial flap and a staged reconstruction.  A telfa template was made of the defect.  This telfa template was then used to outline the posterior auricular interpolation flap.  The donor area for the pedicle flap was then injected with anesthesia.  The flap was excised through the skin and subcutaneous tissue down to the layer of the underlying musculature.  The pedicle flap was carefully excised within this deep plane to maintain its blood supply.  The edges of the donor site were undermined.   The donor site was closed in a primary fashion.  The pedicle was then rotated into position and sutured.  Once the tube was sutured into place, adequate blood supply was confirmed with blanching and refill.  The pedicle was then wrapped with xeroform gauze and dressed appropriately with a telfa and gauze bandage to ensure continued blood supply and protect the attached pedicle.
Histology Selection Override (Optional- Will Default To Parent Diagnosis If N/A): Squamous Cell Carcinoma in situ
Purse String (Intermediate) Text: Given the location of the defect and the characteristics of the surrounding skin a purse string intermediate closure was deemed most appropriate.  Undermining was performed circumfirentially around the surgical defect.  A purse string suture was then placed and tightened.
Repair Performed By Another Provider Text (Leave Blank If You Do Not Want): After obtaining clear surgical margins the defect was repaired by another provider.
Simple / Intermediate / Complex Repair - Final Wound Length In Cm: 5.9
V-Y Plasty Text: The defect edges were debeveled with a #15 scalpel blade.  Given the location of the defect, shape of the defect and the proximity to free margins an V-Y advancement flap was deemed most appropriate.  Using a sterile surgical marker, an appropriate advancement flap was drawn incorporating the defect and placing the expected incisions within the relaxed skin tension lines where possible.    The area thus outlined was incised deep to adipose tissue with a #15 scalpel blade.  The skin margins were undermined to an appropriate distance in all directions utilizing iris scissors.
H Plasty Text: Given the location of the defect, shape of the defect and the proximity to free margins a H-plasty was deemed most appropriate for repair.  Using a sterile surgical marker, the appropriate advancement arms of the H-plasty were drawn incorporating the defect and placing the expected incisions within the relaxed skin tension lines where possible. The area thus outlined was incised deep to adipose tissue with a #15 scalpel blade. The skin margins were undermined to an appropriate distance in all directions utilizing iris scissors.  The opposing advancement arms were then advanced into place in opposite direction and anchored with interrupted buried subcutaneous sutures.
Consent (Scalp)/Introductory Paragraph: The rationale for Mohs was explained to the patient and consent was obtained. The risks, benefits and alternatives to therapy were discussed in detail. Specifically, the risks of changes in hair growth pattern secondary to repair, infection, scarring, bleeding, prolonged wound healing, incomplete removal, allergy to anesthesia, nerve injury and recurrence were addressed. Prior to the procedure, the treatment site was clearly identified and confirmed by the patient. All components of Universal Protocol/PAUSE Rule completed.
Full Thickness Lip Wedge Repair (Flap) Text: Given the location of the defect and the proximity to free margins a full thickness wedge repair was deemed most appropriate.  Using a sterile surgical marker, the appropriate repair was drawn incorporating the defect and placing the expected incisions perpendicular to the vermilion border.  The vermilion border was also meticulously outlined to ensure appropriate reapproximation during the repair.  The area thus outlined was incised through and through with a #15 scalpel blade.  The muscularis and dermis were reaproximated with deep sutures following hemostasis. Care was taken to realign the vermilion border before proceeding with the superficial closure.  Once the vermilion was realigned the superfical and mucosal closure was finished.
Peng Advancement Flap Text: The defect edges were debeveled with a #15 scalpel blade.  Given the location of the defect, shape of the defect and the proximity to free margins a Peng advancement flap was deemed most appropriate.  Using a sterile surgical marker, an appropriate advancement flap was drawn incorporating the defect and placing the expected incisions within the relaxed skin tension lines where possible. The area thus outlined was incised deep to adipose tissue with a #15 scalpel blade.  The skin margins were undermined to an appropriate distance in all directions utilizing iris scissors.
Graft Donor Site Bandage (Optional-Leave Blank If You Don't Want In Note): Aquaplast was fitted to the graft site and sewn into place. A pressure bandage were applied to the donor site and over the aquaplast bolster.
Was The Patient On Physician Recommended Anticoagulation Therapy?: Please Select the Appropriate Response
Unna Boot Text: An Unna boot was placed to help immobilize the limb and facilitate more rapid healing.
Secondary Intention Text (Leave Blank If You Do Not Want): The defect will heal with secondary intention.
Stage 1: Number Of Blocks?: 1
Estlander Flap (Lower To Upper Lip) Text: The defect of the lower lip was assessed and measured.  Given the location and size of the defect, an Estlander flap was deemed most appropriate.  Using a sterile surgical marker, an appropriate Estlander flap was measured and drawn on the upper lip. Local anesthesia was then infiltrated. A scalpel was then used to incise the lateral aspect of the flap, through skin, muscle and mucosa, leaving the flap pedicled medially.  The flap was then rotated and positioned to fill the lower lip defect.  The flap was then sutured into place with a three layer technique, closing the orbicularis oris muscle layer with subcutaneous buried sutures, followed by a mucosal layer and an epidermal layer.
Staging Info: By selecting yes to the question above you will include information on AJCC 8 tumor staging in your Mohs note. Information on tumor staging will be automatically added for SCCs on the head and neck. AJCC 8 includes tumor size, tumor depth, perineural involvement and bone invasion.
Pinch Graft Text: The defect edges were debeveled with a #15 scalpel blade. Given the location of the defect, shape of the defect and the proximity to free margins a pinch graft was deemed most appropriate. Using a sterile surgical marker, the primary defect shape was transferred to the donor site. The area thus outlined was incised deep to adipose tissue with a #15 scalpel blade.  The harvested graft was then trimmed of adipose tissue until only dermis and epidermis was left. The skin margins of the secondary defect were undermined to an appropriate distance in all directions utilizing iris scissors.  The secondary defect was closed with interrupted buried subcutaneous sutures.  The skin edges were then re-apposed with running  sutures.  The skin graft was then placed in the primary defect and oriented appropriately.
Interpolation Flap Text: A decision was made to reconstruct the defect utilizing an interpolation axial flap and a staged reconstruction.  A telfa template was made of the defect.  This telfa template was then used to outline the interpolation flap.  The donor area for the pedicle flap was then injected with anesthesia.  The flap was excised through the skin and subcutaneous tissue down to the layer of the underlying musculature.  The interpolation flap was carefully excised within this deep plane to maintain its blood supply.  The edges of the donor site were undermined.   The donor site was closed in a primary fashion.  The pedicle was then rotated into position and sutured.  Once the tube was sutured into place, adequate blood supply was confirmed with blanching and refill.  The pedicle was then wrapped with xeroform gauze and dressed appropriately with a telfa and gauze bandage to ensure continued blood supply and protect the attached pedicle.
Undermining Location (Optional): in the superficial subcutaneous fat
Depth Of Tumor Invasion (For Histology): dermis
Surgical Defect Length In Cm (Optional): 3.0
Split-Thickness Skin Graft Text: The defect edges were debeveled with a #15 scalpel blade.  Given the location of the defect, shape of the defect and the proximity to free margins a split thickness skin graft was deemed most appropriate.  Using a sterile surgical marker, the primary defect shape was transferred to the donor site. The split thickness graft was then harvested.  The skin graft was then placed in the primary defect and oriented appropriately.
Composite Graft Text: The defect edges were debeveled with a #15 scalpel blade.  Given the location of the defect, shape of the defect, the proximity to free margins and the fact the defect was full thickness a composite graft was deemed most appropriate.  The defect was outline and then transferred to the donor site.  A full thickness graft was then excised from the donor site. The graft was then placed in the primary defect, oriented appropriately and then sutured into place.  The secondary defect was then repaired using a primary closure.
Complex Repair Preamble Text (Leave Blank If You Do Not Want): Extensive wide undermining was performed.
Complex Repair And Flap Additional Text (Will Appearing After The Standard Complex Repair Text): The complex repair was not sufficient to completely close the primary defect. The remaining additional defect was repaired with the flap mentioned below.
Closure 3 Information: This tab is for additional flaps and grafts above and beyond our usual structured repairs.  Please note if you enter information here it will not currently bill and you will need to add the billing information manually.
Bcc Histology Text: There were numerous aggregates of basaloid cells.
Home Suture Removal Text: Patient was provided instructions on removing sutures and will remove their sutures at home.  If they have any questions or difficulties they will call the office.
Ear Star Wedge Flap Text: The defect edges were debeveled with a #15 blade scalpel.  Given the location of the defect and the proximity to free margins (helical rim) an ear star wedge flap was deemed most appropriate.  Using a sterile surgical marker, the appropriate flap was drawn incorporating the defect and placing the expected incisions between the helical rim and antihelix where possible.  The area thus outlined was incised through and through with a #15 scalpel blade.
Cheek Interpolation Flap Text: A decision was made to reconstruct the defect utilizing an interpolation axial flap and a staged reconstruction.  A telfa template was made of the defect.  This telfa template was then used to outline the Cheek Interpolation flap.  The donor area for the pedicle flap was then injected with anesthesia.  The flap was excised through the skin and subcutaneous tissue down to the layer of the underlying musculature.  The interpolation flap was carefully excised within this deep plane to maintain its blood supply.  The edges of the donor site were undermined.   The donor site was closed in a primary fashion.  The pedicle was then rotated into position and sutured.  Once the tube was sutured into place, adequate blood supply was confirmed with blanching and refill.  The pedicle was then wrapped with xeroform gauze and dressed appropriately with a telfa and gauze bandage to ensure continued blood supply and protect the attached pedicle.
Helical Rim Text: The closure involved the helical rim.
Staged Advancement Flap Text: The defect edges were debeveled with a #15 scalpel blade.  Given the location of the defect, shape of the defect and the proximity to free margins a staged advancement flap was deemed most appropriate.  Using a sterile surgical marker, an appropriate advancement flap was drawn incorporating the defect and placing the expected incisions within the relaxed skin tension lines where possible. The area thus outlined was incised deep to adipose tissue with a #15 scalpel blade.  The skin margins were undermined to an appropriate distance in all directions utilizing iris scissors.
Rotation Flap Text: The defect edges were debeveled with a #15 scalpel blade.  Given the location of the defect, shape of the defect and the proximity to free margins a rotation flap was deemed most appropriate.  Using a sterile surgical marker, an appropriate rotation flap was drawn incorporating the defect and placing the expected incisions within the relaxed skin tension lines where possible.    The area thus outlined was incised deep to adipose tissue with a #15 scalpel blade.  The skin margins were undermined to an appropriate distance in all directions utilizing iris scissors.
Ftsg Text: The defect edges were debeveled with a #15 scalpel blade.  Given the location of the defect, shape of the defect and the proximity to free margins a full thickness skin graft was deemed most appropriate.  Using a sterile surgical marker, the primary defect shape was transferred to the donor site. The area thus outlined was incised deep to adipose tissue with a #15 scalpel blade.  The harvested graft was then trimmed of adipose tissue until only dermis and epidermis was left.  The skin margins of the secondary defect were undermined to an appropriate distance in all directions utilizing iris scissors.  The secondary defect was closed with interrupted buried subcutaneous sutures.  The skin edges were then re-apposed with running  sutures.  The skin graft was then placed in the primary defect and oriented appropriately.
Repair Type: Complex Repair
Mastoid Interpolation Flap Text: A decision was made to reconstruct the defect utilizing an interpolation axial flap and a staged reconstruction.  A telfa template was made of the defect.  This telfa template was then used to outline the mastoid interpolation flap.  The donor area for the pedicle flap was then injected with anesthesia.  The flap was excised through the skin and subcutaneous tissue down to the layer of the underlying musculature.  The pedicle flap was carefully excised within this deep plane to maintain its blood supply.  The edges of the donor site were undermined.   The donor site was closed in a primary fashion.  The pedicle was then rotated into position and sutured.  Once the tube was sutured into place, adequate blood supply was confirmed with blanching and refill.  The pedicle was then wrapped with xeroform gauze and dressed appropriately with a telfa and gauze bandage to ensure continued blood supply and protect the attached pedicle.
Postop Diagnosis: same
Date Of Previous Biopsy (Optional): outside path
Eyelid Full Thickness Repair - 02664: The eyelid defect was full thickness which required a wedge repair of the eyelid. Special care was taken to ensure that the eyelid margin was realligned when placing sutures.
Alar Island Pedicle Flap Text: The defect edges were debeveled with a #15 scalpel blade.  Given the location of the defect, shape of the defect and the proximity to the alar rim an island pedicle advancement flap was deemed most appropriate.  Using a sterile surgical marker, an appropriate advancement flap was drawn incorporating the defect, outlining the appropriate donor tissue and placing the expected incisions within the nasal ala running parallel to the alar rim. The area thus outlined was incised with a #15 scalpel blade.  The skin margins were undermined minimally to an appropriate distance in all directions around the primary defect and laterally outward around the island pedicle utilizing iris scissors.  There was minimal undermining beneath the pedicle flap.
Consent (Near Eyelid Margin)/Introductory Paragraph: The rationale for Mohs was explained to the patient and consent was obtained. The risks, benefits and alternatives to therapy were discussed in detail. Specifically, the risks of ectropion or eyelid deformity, infection, scarring, bleeding, prolonged wound healing, incomplete removal, allergy to anesthesia, nerve injury and recurrence were addressed. Prior to the procedure, the treatment site was clearly identified and confirmed by the patient. All components of Universal Protocol/PAUSE Rule completed.
Consent Type: Consent 1 (Standard)
Keystone Flap Text: The defect edges were debeveled with a #15 scalpel blade.  Given the location of the defect, shape of the defect a keystone flap was deemed most appropriate.  Using a sterile surgical marker, an appropriate keystone flap was drawn incorporating the defect, outlining the appropriate donor tissue and placing the expected incisions within the relaxed skin tension lines where possible. The area thus outlined was incised deep to adipose tissue with a #15 scalpel blade.  The skin margins were undermined to an appropriate distance in all directions around the primary defect and laterally outward around the flap utilizing iris scissors.
Surgeon Performing Repair (Optional): Gina Mejia MD
O-T Plasty Text: The defect edges were debeveled with a #15 scalpel blade.  Given the location of the defect, shape of the defect and the proximity to free margins an O-T plasty was deemed most appropriate.  Using a sterile surgical marker, an appropriate O-T plasty was drawn incorporating the defect and placing the expected incisions within the relaxed skin tension lines where possible.    The area thus outlined was incised deep to adipose tissue with a #15 scalpel blade.  The skin margins were undermined to an appropriate distance in all directions utilizing iris scissors.
Nasal Turnover Hinge Flap Text: The defect edges were debeveled with a #15 scalpel blade.  Given the size, depth, location of the defect and the defect being full thickness a nasal turnover hinge flap was deemed most appropriate.  Using a sterile surgical marker, an appropriate hinge flap was drawn incorporating the defect. The area thus outlined was incised with a #15 scalpel blade. The flap was designed to recreate the nasal mucosal lining and the alar rim. The skin margins were undermined to an appropriate distance in all directions utilizing iris scissors.
Abbe Flap (Upper To Lower Lip) Text: The defect of the lower lip was assessed and measured.  Given the location and size of the defect, an Abbe flap was deemed most appropriate.  Using a sterile surgical marker, an appropriate Abbe flap was measured and drawn on the upper lip. Local anesthesia was then infiltrated.  A scalpel was then used to incise the upper lip through and through the skin, vermilion, muscle and mucosa, leaving the flap pedicled on the opposite side.  The flap was then rotated and transferred to the lower lip defect.  The flap was then sutured into place with a three layer technique, closing the orbicularis oris muscle layer with subcutaneous buried sutures, followed by a mucosal layer and an epidermal layer.
Undermining Type: Entire Wound
Hemigard Retention Suture: 0-0 Nylon
Partial Purse String (Intermediate) Text: Given the location of the defect and the characteristics of the surrounding skin an intermediate purse string closure was deemed most appropriate.  Undermining was performed circumfirentially around the surgical defect.  A purse string suture was then placed and tightened. Wound tension only allowed a partial closure of the circular defect.
Brow Lift Text: A midfrontal incision was made medially to the defect to allow access to the tissues just superior to the left eyebrow. Following careful dissection inferiorly in a supraperiosteal plane to the level of the left eyebrow, several 3-0 monocryl sutures were used to resuspend the eyebrow orbicularis oculi muscular unit to the superior frontal bone periosteum. This resulted in an appropriate reapproximation of static eyebrow symmetry and correction of the left brow ptosis.
Alternatives Discussed Intro (Do Not Add Period): I discussed alternative treatments to Mohs surgery and specifically discussed the risks and benefits of
Tissue Cultured Epidermal Autograft Text: The defect edges were debeveled with a #15 scalpel blade.  Given the location of the defect, shape of the defect and the proximity to free margins a tissue cultured epidermal autograft was deemed most appropriate.  The graft was then trimmed to fit the size of the defect.  The graft was then placed in the primary defect and oriented appropriately.
Zygomaticofacial Flap Text: Given the location of the defect, shape of the defect and the proximity to free margins a zygomaticofacial flap was deemed most appropriate for repair.  Using a sterile surgical marker, the appropriate flap was drawn incorporating the defect and placing the expected incisions within the relaxed skin tension lines where possible. The area thus outlined was incised deep to adipose tissue with a #15 scalpel blade with preservation of a vascular pedicle.  The skin margins were undermined to an appropriate distance in all directions utilizing iris scissors.  The flap was then placed into the defect and anchored with interrupted buried subcutaneous sutures.
Adjacent Tissue Transfer Text: The defect edges were debeveled with a #15 scalpel blade.  Given the location of the defect and the proximity to free margins an adjacent tissue transfer was deemed most appropriate.  Using a sterile surgical marker, an appropriate flap was drawn incorporating the defect and placing the expected incisions within the relaxed skin tension lines where possible.    The area thus outlined was incised deep to adipose tissue with a #15 scalpel blade.  The skin margins were undermined to an appropriate distance in all directions utilizing iris scissors.
Vermilion Border Text: The closure involved the vermilion border.
Ear Wedge Repair Text: A wedge excision was completed by carrying down an excision through the full thickness of the ear and cartilage with an inward facing Burow's triangle. The wound was then closed in a layered fashion.
Primary Defect Length In Cm (Final Defect Size - Required For Flaps/Grafts): 3
Paramedian Forehead Flap Text: A decision was made to reconstruct the defect utilizing an interpolation axial flap and a staged reconstruction.  A telfa template was made of the defect.  This telfa template was then used to outline the paramedian forehead pedicle flap.  The donor area for the pedicle flap was then injected with anesthesia.  The flap was excised through the skin and subcutaneous tissue down to the layer of the underlying musculature.  The pedicle flap was carefully excised within this deep plane to maintain its blood supply.  The edges of the donor site were undermined.   The donor site was closed in a primary fashion.  The pedicle was then rotated into position and sutured.  Once the tube was sutured into place, adequate blood supply was confirmed with blanching and refill.  The pedicle was then wrapped with xeroform gauze and dressed appropriately with a telfa and gauze bandage to ensure continued blood supply and protect the attached pedicle.
Melolabial Interpolation Flap Text: A decision was made to reconstruct the defect utilizing an interpolation axial flap and a staged reconstruction.  A telfa template was made of the defect.  This telfa template was then used to outline the melolabial interpolation flap.  The donor area for the pedicle flap was then injected with anesthesia.  The flap was excised through the skin and subcutaneous tissue down to the layer of the underlying musculature.  The pedicle flap was carefully excised within this deep plane to maintain its blood supply.  The edges of the donor site were undermined.   The donor site was closed in a primary fashion.  The pedicle was then rotated into position and sutured.  Once the tube was sutured into place, adequate blood supply was confirmed with blanching and refill.  The pedicle was then wrapped with xeroform gauze and dressed appropriately with a telfa and gauze bandage to ensure continued blood supply and protect the attached pedicle.
Helical Rim Advancement Flap Text: The defect edges were debeveled with a #15 blade scalpel.  Given the location of the defect and the proximity to free margins (helical rim) a double helical rim advancement flap was deemed most appropriate.  Using a sterile surgical marker, the appropriate advancement flaps were drawn incorporating the defect and placing the expected incisions between the helical rim and antihelix where possible.  The area thus outlined was incised through and through with a #15 scalpel blade.  With a skin hook and iris scissors, the flaps were gently and sharply undermined and freed up.
Double Island Pedicle Flap Text: The defect edges were debeveled with a #15 scalpel blade.  Given the location of the defect, shape of the defect and the proximity to free margins a double island pedicle advancement flap was deemed most appropriate.  Using a sterile surgical marker, an appropriate advancement flap was drawn incorporating the defect, outlining the appropriate donor tissue and placing the expected incisions within the relaxed skin tension lines where possible.    The area thus outlined was incised deep to adipose tissue with a #15 scalpel blade.  The skin margins were undermined to an appropriate distance in all directions around the primary defect and laterally outward around the island pedicle utilizing iris scissors.  There was minimal undermining beneath the pedicle flap.
Post-Care Instructions: I reviewed with the patient in detail post-care instructions. Patient is not to engage in any heavy lifting, exercise, or swimming for the next 14 days. Should the patient develop any fevers, chills, bleeding, severe pain patient will contact the office immediately.
Epidermal Autograft Text: The defect edges were debeveled with a #15 scalpel blade.  Given the location of the defect, shape of the defect and the proximity to free margins an epidermal autograft was deemed most appropriate.  Using a sterile surgical marker, the primary defect shape was transferred to the donor site. The epidermal graft was then harvested.  The skin graft was then placed in the primary defect and oriented appropriately.
Pain Refusal Text: I offered to prescribe pain medication but the patient refused to take this medication.
Presence Of Scar Tissue (For Histology): absent
Location Indication Override (Is Already Calculated Based On Selected Body Location): Area H
Retention Suture Text: Retention sutures were placed to support the closure and prevent dehiscence.
Tarsorrhaphy Text: A tarsorrhaphy was performed using Frost sutures.
Chonodrocutaneous Helical Advancement Flap Text: The defect edges were debeveled with a #15 scalpel blade.  Given the location of the defect and the proximity to free margins a chondrocutaneous helical advancement flap was deemed most appropriate.  Using a sterile surgical marker, the appropriate advancement flap was drawn incorporating the defect and placing the expected incisions within the relaxed skin tension lines where possible.    The area thus outlined was incised deep to adipose tissue with a #15 scalpel blade.  The skin margins were undermined to an appropriate distance in all directions utilizing iris scissors.
Hemigard Intro: Due to skin fragility and wound tension, it was decided to use HEMIGARD adhesive retention suture devices to permit a linear closure. The skin was cleaned and dried for a 6cm distance away from the wound. Excessive hair, if present, was removed to allow for adhesion.
Advancement Flap (Double) Text: The defect edges were debeveled with a #15 scalpel blade.  Given the location of the defect and the proximity to free margins a double advancement flap was deemed most appropriate.  Using a sterile surgical marker, the appropriate advancement flaps were drawn incorporating the defect and placing the expected incisions within the relaxed skin tension lines where possible.    The area thus outlined was incised deep to adipose tissue with a #15 scalpel blade.  The skin margins were undermined to an appropriate distance in all directions utilizing iris scissors.
Bilobed Flap Text: The defect edges were debeveled with a #15 scalpel blade.  Given the location of the defect and the proximity to free margins a bilobe flap was deemed most appropriate.  Using a sterile surgical marker, an appropriate bilobe flap drawn around the defect.    The area thus outlined was incised deep to adipose tissue with a #15 scalpel blade.  The skin margins were undermined to an appropriate distance in all directions utilizing iris scissors.
Consent 1/Introductory Paragraph: The rationale for Mohs was explained to the patient and consent was obtained. The risks, benefits and alternatives to therapy were discussed in detail. Specifically, the risks of infection, scarring, bleeding, prolonged wound healing, incomplete removal, allergy to anesthesia, nerve injury and recurrence were addressed. Prior to the procedure, the treatment site was clearly identified and confirmed by the patient. All components of Universal Protocol/PAUSE Rule completed.
Banner Transposition Flap Text: The defect edges were debeveled with a #15 scalpel blade.  Given the location of the defect and the proximity to free margins a Banner transposition flap was deemed most appropriate.  Using a sterile surgical marker, an appropriate flap drawn around the defect. The area thus outlined was incised deep to adipose tissue with a #15 scalpel blade.  The skin margins were undermined to an appropriate distance in all directions utilizing iris scissors.
Advancement Flap (Single) Text: The defect edges were debeveled with a #15 scalpel blade.  Given the location of the defect and the proximity to free margins a single advancement flap was deemed most appropriate.  Using a sterile surgical marker, an appropriate advancement flap was drawn incorporating the defect and placing the expected incisions within the relaxed skin tension lines where possible.    The area thus outlined was incised deep to adipose tissue with a #15 scalpel blade.  The skin margins were undermined to an appropriate distance in all directions utilizing iris scissors.
Orbicularis Oris Muscle Flap Text: The defect edges were debeveled with a #15 scalpel blade.  Given that the defect affected the competency of the oral sphincter an orbicularis oris muscle flap was deemed most appropriate to restore this competency and normal muscle function.  Using a sterile surgical marker, an appropriate flap was drawn incorporating the defect. The area thus outlined was incised with a #15 scalpel blade.
Bcc Infiltrative Histology Text: There were numerous aggregates of basaloid cells demonstrating an infiltrative pattern.
Area H Indication Text: Tumors in this location are included in Area H (eyelids, eyebrows, nose, lips, chin, ear, pre-auricular, post-auricular, temple, genitalia, hands, feet, ankles and areola).  Tissue conservation is critical in these anatomic locations.
Hatchet Flap Text: The defect edges were debeveled with a #15 scalpel blade.  Given the location of the defect, shape of the defect and the proximity to free margins a hatchet flap was deemed most appropriate.  Using a sterile surgical marker, an appropriate hatchet flap was drawn incorporating the defect and placing the expected incisions within the relaxed skin tension lines where possible.    The area thus outlined was incised deep to adipose tissue with a #15 scalpel blade.  The skin margins were undermined to an appropriate distance in all directions utilizing iris scissors.
Spiral Flap Text: The defect edges were debeveled with a #15 scalpel blade.  Given the location of the defect, shape of the defect and the proximity to free margins a spiral flap was deemed most appropriate.  Using a sterile surgical marker, an appropriate rotation flap was drawn incorporating the defect and placing the expected incisions within the relaxed skin tension lines where possible. The area thus outlined was incised deep to adipose tissue with a #15 scalpel blade.  The skin margins were undermined to an appropriate distance in all directions utilizing iris scissors.
Wound Care (No Sutures): Petrolatum
Mohs Case Number: [id number]
Asc Procedure Text (C): After obtaining clear surgical margins the patient was sent to an ASC for surgical repair.  The patient understands they will receive post-surgical care and follow-up from the ASC physician.
Consent (Nose)/Introductory Paragraph: The rationale for Mohs was explained to the patient and consent was obtained. The risks, benefits and alternatives to therapy were discussed in detail. Specifically, the risks of nasal deformity, changes in the flow of air through the nose, infection, scarring, bleeding, prolonged wound healing, incomplete removal, allergy to anesthesia, nerve injury and recurrence were addressed. Prior to the procedure, the treatment site was clearly identified and confirmed by the patient. All components of Universal Protocol/PAUSE Rule completed.
Muscle Hinge Flap Text: The defect edges were debeveled with a #15 scalpel blade.  Given the size, depth and location of the defect and the proximity to free margins a muscle hinge flap was deemed most appropriate.  Using a sterile surgical marker, an appropriate hinge flap was drawn incorporating the defect. The area thus outlined was incised with a #15 scalpel blade.  The skin margins were undermined to an appropriate distance in all directions utilizing iris scissors.
Graft Donor Site Epidermal Sutures (Optional): 5-0 Surgipro
Referring Physician (Optional): Raffy Bae NP
Cheek-To-Nose Interpolation Flap Text: A decision was made to reconstruct the defect utilizing an interpolation axial flap and a staged reconstruction.  A telfa template was made of the defect.  This telfa template was then used to outline the Cheek-To-Nose Interpolation flap.  The donor area for the pedicle flap was then injected with anesthesia.  The flap was excised through the skin and subcutaneous tissue down to the layer of the underlying musculature.  The interpolation flap was carefully excised within this deep plane to maintain its blood supply.  The edges of the donor site were undermined.   The donor site was closed in a primary fashion.  The pedicle was then rotated into position and sutured.  Once the tube was sutured into place, adequate blood supply was confirmed with blanching and refill.  The pedicle was then wrapped with xeroform gauze and dressed appropriately with a telfa and gauze bandage to ensure continued blood supply and protect the attached pedicle.
Subsequent Stages Histo Method Verbiage: Using a similar technique to that described above, a thin layer of tissue was removed from all areas where tumor was visible on the previous stage.  The tissue was again oriented, mapped, dyed, and processed as above.
Purse String (Simple) Text: Given the location of the defect and the characteristics of the surrounding skin a purse string closure was deemed most appropriate.  Undermining was performed circumfirentially around the surgical defect.  A purse string suture was then placed and tightened.
O-L Flap Text: The defect edges were debeveled with a #15 scalpel blade.  Given the location of the defect, shape of the defect and the proximity to free margins an O-L flap was deemed most appropriate.  Using a sterile surgical marker, an appropriate advancement flap was drawn incorporating the defect and placing the expected incisions within the relaxed skin tension lines where possible.    The area thus outlined was incised deep to adipose tissue with a #15 scalpel blade.  The skin margins were undermined to an appropriate distance in all directions utilizing iris scissors.
Nasalis-Muscle-Based Myocutaneous Island Pedicle Flap Text: Using a #15 blade, an incision was made around the donor flap to the level of the nasalis muscle. Wide lateral undermining was then performed in both the subcutaneous plane above the nasalis muscle, and in a submuscular plane just above periosteum. This allowed the formation of a free nasalis muscle axial pedicle (based on the angular artery) which was still attached to the actual cutaneous flap, increasing its mobility and vascular viability. Hemostasis was obtained with pinpoint electrocoagulation. The flap was mobilized into position and the pivotal anchor points positioned and stabilized with buried interrupted sutures. Subcutaneous and dermal tissues were closed in a multilayered fashion with sutures. Tissue redundancies were excised, and the epidermal edges were apposed without significant tension and sutured with sutures.
Mauc Instructions: By selecting yes to the question below the MAUC number will be added into the note.  This will be calculated automatically based on the diagnosis chosen, the size entered, the body zone selected (H,M,L) and the specific indications you chose. You will also have the option to override the Mohs AUC if you disagree with the automatically calculated number and this option is found in the Case Summary tab.
Surgical Defect Length In Cm (Optional): 2.6
A-T Advancement Flap Text: The defect edges were debeveled with a #15 scalpel blade.  Given the location of the defect, shape of the defect and the proximity to free margins an A-T advancement flap was deemed most appropriate.  Using a sterile surgical marker, an appropriate advancement flap was drawn incorporating the defect and placing the expected incisions within the relaxed skin tension lines where possible.    The area thus outlined was incised deep to adipose tissue with a #15 scalpel blade.  The skin margins were undermined to an appropriate distance in all directions utilizing iris scissors.
Wound Care: Vaseline
Retention Suture Bite Size: 1 mm
Island Pedicle Flap Text: The defect edges were debeveled with a #15 scalpel blade.  Given the location of the defect, shape of the defect and the proximity to free margins an island pedicle advancement flap was deemed most appropriate.  Using a sterile surgical marker, an appropriate advancement flap was drawn incorporating the defect, outlining the appropriate donor tissue and placing the expected incisions within the relaxed skin tension lines where possible.    The area thus outlined was incised deep to adipose tissue with a #15 scalpel blade.  The skin margins were undermined to an appropriate distance in all directions around the primary defect and laterally outward around the island pedicle utilizing iris scissors.  There was minimal undermining beneath the pedicle flap.
Dressing (No Sutures): steri-strips and pressure dressing
Bi-Rhombic Flap Text: The defect edges were debeveled with a #15 scalpel blade.  Given the location of the defect and the proximity to free margins a bi-rhombic flap was deemed most appropriate.  Using a sterile surgical marker, an appropriate rhombic flap was drawn incorporating the defect. The area thus outlined was incised deep to adipose tissue with a #15 scalpel blade.  The skin margins were undermined to an appropriate distance in all directions utilizing iris scissors.
Area M Indication Text: Tumors in this location are included in Area M (cheek, forehead, scalp, neck, jawline and pretibial skin).  Mohs surgery is indicated for tumors in these anatomic locations.
W Plasty Text: The lesion was extirpated to the level of the fat with a #15 scalpel blade.  Given the location of the defect, shape of the defect and the proximity to free margins a W-plasty was deemed most appropriate for repair.  Using a sterile surgical marker, the appropriate transposition arms of the W-plasty were drawn incorporating the defect and placing the expected incisions within the relaxed skin tension lines where possible.    The area thus outlined was incised deep to adipose tissue with a #15 scalpel blade.  The skin margins were undermined to an appropriate distance in all directions utilizing iris scissors.  The opposing transposition arms were then transposed into place in opposite direction and anchored with interrupted buried subcutaneous sutures.
Consent (Temporal Branch)/Introductory Paragraph: The rationale for Mohs was explained to the patient and consent was obtained. The risks, benefits and alternatives to therapy were discussed in detail. Specifically, the risks of damage to the temporal branch of the facial nerve, infection, scarring, bleeding, prolonged wound healing, incomplete removal, allergy to anesthesia, and recurrence were addressed. Prior to the procedure, the treatment site was clearly identified and confirmed by the patient. All components of Universal Protocol/PAUSE Rule completed.
No Repair - Repaired With Adjacent Surgical Defect Text (Leave Blank If You Do Not Want): After obtaining clear surgical margins the defect was repaired concurrently with another surgical defect which was in close approximation.
Mohs Method Verbiage: An incision at a 45 degree angle following the standard Mohs approach was done and the specimen was harvested as a microscopic controlled layer.
Same Histology In Subsequent Stages Text: The pattern and morphology of the tumor is as described in the first stage.
Dorsal Nasal Flap Text: The defect edges were debeveled with a #15 scalpel blade.  Given the location of the defect and the proximity to free margins a dorsal nasal flap was deemed most appropriate.  Using a sterile surgical marker, an appropriate dorsal nasal flap was drawn around the defect.    The area thus outlined was incised deep to adipose tissue with a #15 scalpel blade.  The skin margins were undermined to an appropriate distance in all directions utilizing iris scissors.
Consent (Marginal Mandibular)/Introductory Paragraph: The rationale for Mohs was explained to the patient and consent was obtained. The risks, benefits and alternatives to therapy were discussed in detail. Specifically, the risks of damage to the marginal mandibular branch of the facial nerve, infection, scarring, bleeding, prolonged wound healing, incomplete removal, allergy to anesthesia, and recurrence were addressed. Prior to the procedure, the treatment site was clearly identified and confirmed by the patient. All components of Universal Protocol/PAUSE Rule completed.
Eye Protection Verbiage: Before proceeding with the stage, a plastic scleral shield was inserted. The globe was anesthetized with a few drops of 1% lidocaine with 1:100,000 epinephrine. Then, an appropriate sized scleral shield was chosen and coated with lacrilube ointment. The shield was gently inserted and left in place for the duration of each stage. After the stage was completed, the shield was gently removed.
Graft Donor Site Dermal Sutures (Optional): 5-0 Polysorb
Double Z Plasty Text: The lesion was extirpated to the level of the fat with a #15 scalpel blade. Given the location of the defect, shape of the defect and the proximity to free margins a double Z-plasty was deemed most appropriate for repair. Using a sterile surgical marker, the appropriate transposition arms of the double Z-plasty were drawn incorporating the defect and placing the expected incisions within the relaxed skin tension lines where possible. The area thus outlined was incised deep to adipose tissue with a #15 scalpel blade. The skin margins were undermined to an appropriate distance in all directions utilizing iris scissors. The opposing transposition arms were then transposed and carried over into place in opposite direction and anchored with interrupted buried subcutaneous sutures.
Abbe Flap (Lower To Upper Lip) Text: The defect of the upper lip was assessed and measured.  Given the location and size of the defect, an Abbe flap was deemed most appropriate.  Using a sterile surgical marker, an appropriate Abbe flap was measured and drawn on the lower lip. Local anesthesia was then infiltrated. A scalpel was then used to incise the upper lip through and through the skin, vermilion, muscle and mucosa, leaving the flap pedicled on the opposite side.  The flap was then rotated and transferred to the lower lip defect.  The flap was then sutured into place with a three layer technique, closing the orbicularis oris muscle layer with subcutaneous buried sutures, followed by a mucosal layer and an epidermal layer.
Consent (Ear)/Introductory Paragraph: The rationale for Mohs was explained to the patient and consent was obtained. The risks, benefits and alternatives to therapy were discussed in detail. Specifically, the risks of ear deformity, infection, scarring, bleeding, prolonged wound healing, incomplete removal, allergy to anesthesia, nerve injury and recurrence were addressed. Prior to the procedure, the treatment site was clearly identified and confirmed by the patient. All components of Universal Protocol/PAUSE Rule completed.
Surgeon/Pathologist Verbiage (Will Incorporate Name Of Surgeon From Intro If Not Blank): operated in two distinct and integrated capacities as the surgeon and pathologist.
Repair Hemostasis (Optional): Pinpoint electrocautery
Medical Necessity Statement: Based on my medical judgement, Mohs surgery is the most appropriate treatment for this cancer compared to other treatments.
Mercedes Flap Text: The defect edges were debeveled with a #15 scalpel blade.  Given the location of the defect, shape of the defect and the proximity to free margins a Mercedes flap was deemed most appropriate.  Using a sterile surgical marker, an appropriate advancement flap was drawn incorporating the defect and placing the expected incisions within the relaxed skin tension lines where possible. The area thus outlined was incised deep to adipose tissue with a #15 scalpel blade.  The skin margins were undermined to an appropriate distance in all directions utilizing iris scissors.
Nostril Rim Text: The closure involved the nostril rim.
Burow's Advancement Flap Text: The defect edges were debeveled with a #15 scalpel blade.  Given the location of the defect and the proximity to free margins a Burow's advancement flap was deemed most appropriate.  Using a sterile surgical marker, the appropriate advancement flap was drawn incorporating the defect and placing the expected incisions within the relaxed skin tension lines where possible.    The area thus outlined was incised deep to adipose tissue with a #15 scalpel blade.  The skin margins were undermined to an appropriate distance in all directions utilizing iris scissors.
Where Do You Want The Question To Include Opioid Counseling Located?: Case Summary Tab
Donor Site Anesthesia Type: same as repair anesthesia
Transposition Flap Text: The defect edges were debeveled with a #15 scalpel blade.  Given the location of the defect and the proximity to free margins a transposition flap was deemed most appropriate.  Using a sterile surgical marker, an appropriate transposition flap was drawn incorporating the defect.    The area thus outlined was incised deep to adipose tissue with a #15 scalpel blade.  The skin margins were undermined to an appropriate distance in all directions utilizing iris scissors.
No Residual Tumor Seen Histology Text: There were no malignant cells seen in the sections examined.
Suturegard Body: The suture ends were repeatedly re-tightened and re-clamped to achieve the desired tissue expansion.
Mart-1 - Negative Histology Text: MART-1 staining demonstrates a normal density and pattern of melanocytes along the dermal-epidermal junction. The surgical margins are negative for tumor cells.
Double O-Z Plasty Text: The defect edges were debeveled with a #15 scalpel blade.  Given the location of the defect, shape of the defect and the proximity to free margins a Double O-Z plasty (double transposition flap) was deemed most appropriate.  Using a sterile surgical marker, the appropriate transposition flaps were drawn incorporating the defect and placing the expected incisions within the relaxed skin tension lines where possible. The area thus outlined was incised deep to adipose tissue with a #15 scalpel blade.  The skin margins were undermined to an appropriate distance in all directions utilizing iris scissors.  Hemostasis was achieved with electrocautery.  The flaps were then transposed into place, one clockwise and the other counterclockwise, and anchored with interrupted buried subcutaneous sutures.
Bilateral Helical Rim Advancement Flap Text: The defect edges were debeveled with a #15 blade scalpel.  Given the location of the defect and the proximity to free margins (helical rim) a bilateral helical rim advancement flap was deemed most appropriate.  Using a sterile surgical marker, the appropriate advancement flaps were drawn incorporating the defect and placing the expected incisions between the helical rim and antihelix where possible.  The area thus outlined was incised through and through with a #15 scalpel blade.  With a skin hook and iris scissors, the flaps were gently and sharply undermined and freed up.
Debridement Text: The wound edges were debrided prior to proceeding with the closure to facilitate wound healing.
Crescentic Advancement Flap Text: The defect edges were debeveled with a #15 scalpel blade.  Given the location of the defect and the proximity to free margins a crescentic advancement flap was deemed most appropriate.  Using a sterile surgical marker, the appropriate advancement flap was drawn incorporating the defect and placing the expected incisions within the relaxed skin tension lines where possible.    The area thus outlined was incised deep to adipose tissue with a #15 scalpel blade.  The skin margins were undermined to an appropriate distance in all directions utilizing iris scissors.
Consent 3/Introductory Paragraph: I gave the patient a chance to ask questions they had about the procedure.  Following this I explained the Mohs procedure and consent was obtained. The risks, benefits and alternatives to therapy were discussed in detail. Specifically, the risks of infection, scarring, bleeding, prolonged wound healing, incomplete removal, allergy to anesthesia, nerve injury and recurrence were addressed. Prior to the procedure, the treatment site was clearly identified and confirmed by the patient. All components of Universal Protocol/PAUSE Rule completed.
X Size Of Lesion In Cm (Optional): 0.7
Body Location Override (Optional - Billing Will Still Be Based On Selected Body Map Location If Applicable): right pre auricular
Bilobed Transposition Flap Text: The defect edges were debeveled with a #15 scalpel blade.  Given the location of the defect and the proximity to free margins a bilobed transposition flap was deemed most appropriate.  Using a sterile surgical marker, an appropriate bilobe flap drawn around the defect.    The area thus outlined was incised deep to adipose tissue with a #15 scalpel blade.  The skin margins were undermined to an appropriate distance in all directions utilizing iris scissors.
Rhombic Flap Text: The defect edges were debeveled with a #15 scalpel blade.  Given the location of the defect and the proximity to free margins a rhombic flap was deemed most appropriate.  Using a sterile surgical marker, an appropriate rhombic flap was drawn incorporating the defect.    The area thus outlined was incised deep to adipose tissue with a #15 scalpel blade.  The skin margins were undermined to an appropriate distance in all directions utilizing iris scissors.
Which Eyelid Repair Cpt Are You Using?: 39225
Mucosal Advancement Flap Text: Given the location of the defect, shape of the defect and the proximity to free margins a mucosal advancement flap was deemed most appropriate. Incisions were made with a 15 blade scalpel in the appropriate fashion along the cutaneous vermilion border and the mucosal lip. The remaining actinically damaged mucosal tissue was excised.  The mucosal advancement flap was then elevated to the gingival sulcus with care taken to preserve the neurovascular structures and advanced into the primary defect. Care was taken to ensure that precise realignment of the vermilion border was achieved.
Mohs Rapid Report Verbiage: The area of clinically evident tumor was marked with skin marking ink and appropriately hatched.  The initial incision was made following the Mohs approach through the skin.  The specimen was taken to the lab, divided into the necessary number of pieces, chromacoded and processed according to the Mohs protocol.  This was repeated in successive stages until a tumor free defect was achieved.
O-T Advancement Flap Text: The defect edges were debeveled with a #15 scalpel blade.  Given the location of the defect, shape of the defect and the proximity to free margins an O-T advancement flap was deemed most appropriate.  Using a sterile surgical marker, an appropriate advancement flap was drawn incorporating the defect and placing the expected incisions within the relaxed skin tension lines where possible.    The area thus outlined was incised deep to adipose tissue with a #15 scalpel blade.  The skin margins were undermined to an appropriate distance in all directions utilizing iris scissors.
Burow's Graft Text: The defect edges were debeveled with a #15 scalpel blade.  Given the location of the defect, shape of the defect, the proximity to free margins and the presence of a standing cone deformity a Burow's skin graft was deemed most appropriate. The standing cone was removed and this tissue was then trimmed to the shape of the primary defect. The adipose tissue was also removed until only dermis and epidermis were left.  The skin margins of the secondary defect were undermined to an appropriate distance in all directions utilizing iris scissors.  The secondary defect was closed with interrupted buried subcutaneous sutures.  The skin edges were then re-apposed with running  sutures.  The skin graft was then placed in the primary defect and oriented appropriately.
Repair Anesthesia Method: local infiltration
Advancement-Rotation Flap Text: The defect edges were debeveled with a #15 scalpel blade.  Given the location of the defect, shape of the defect and the proximity to free margins an advancement-rotation flap was deemed most appropriate.  Using a sterile surgical marker, an appropriate flap was drawn incorporating the defect and placing the expected incisions within the relaxed skin tension lines where possible. The area thus outlined was incised deep to adipose tissue with a #15 scalpel blade.  The skin margins were undermined to an appropriate distance in all directions utilizing iris scissors.
Melolabial Transposition Flap Text: The defect edges were debeveled with a #15 scalpel blade.  Given the location of the defect and the proximity to free margins a melolabial flap was deemed most appropriate.  Using a sterile surgical marker, an appropriate melolabial transposition flap was drawn incorporating the defect.    The area thus outlined was incised deep to adipose tissue with a #15 scalpel blade.  The skin margins were undermined to an appropriate distance in all directions utilizing iris scissors.
Epidermal Closure Graft Donor Site (Optional): running
Epidermal Sutures: 5-0 Fast Absorbing Gut
Hemostasis: Electrocautery
Consent 2/Introductory Paragraph: Mohs surgery was explained to the patient and consent was obtained. The risks, benefits and alternatives to therapy were discussed in detail. Specifically, the risks of infection, scarring, bleeding, prolonged wound healing, incomplete removal, allergy to anesthesia, nerve injury and recurrence were addressed. Prior to the procedure, the treatment site was clearly identified and confirmed by the patient. All components of Universal Protocol/PAUSE Rule completed.
Suturegard Intro: Intraoperative tissue expansion was performed, utilizing the SUTUREGARD device, in order to reduce wound tension.
Island Pedicle Flap-Requiring Vessel Identification Text: The defect edges were debeveled with a #15 scalpel blade.  Given the location of the defect, shape of the defect and the proximity to free margins an island pedicle advancement flap was deemed most appropriate.  Using a sterile surgical marker, an appropriate advancement flap was drawn, based on the axial vessel mentioned above, incorporating the defect, outlining the appropriate donor tissue and placing the expected incisions within the relaxed skin tension lines where possible.    The area thus outlined was incised deep to adipose tissue with a #15 scalpel blade.  The skin margins were undermined to an appropriate distance in all directions around the primary defect and laterally outward around the island pedicle utilizing iris scissors.  There was minimal undermining beneath the pedicle flap.
Number Of Stages: 2
Consent (Spinal Accessory)/Introductory Paragraph: The rationale for Mohs was explained to the patient and consent was obtained. The risks, benefits and alternatives to therapy were discussed in detail. Specifically, the risks of damage to the spinal accessory nerve, infection, scarring, bleeding, prolonged wound healing, incomplete removal, allergy to anesthesia, and recurrence were addressed. Prior to the procedure, the treatment site was clearly identified and confirmed by the patient. All components of Universal Protocol/PAUSE Rule completed.
Surgical Defect Width In Cm (Optional): 1.0
Rhomboid Transposition Flap Text: The defect edges were debeveled with a #15 scalpel blade.  Given the location of the defect and the proximity to free margins a rhomboid transposition flap was deemed most appropriate.  Using a sterile surgical marker, an appropriate rhomboid flap was drawn incorporating the defect.    The area thus outlined was incised deep to adipose tissue with a #15 scalpel blade.  The skin margins were undermined to an appropriate distance in all directions utilizing iris scissors.
Z Plasty Text: The lesion was extirpated to the level of the fat with a #15 scalpel blade.  Given the location of the defect, shape of the defect and the proximity to free margins a Z-plasty was deemed most appropriate for repair.  Using a sterile surgical marker, the appropriate transposition arms of the Z-plasty were drawn incorporating the defect and placing the expected incisions within the relaxed skin tension lines where possible.    The area thus outlined was incised deep to adipose tissue with a #15 scalpel blade.  The skin margins were undermined to an appropriate distance in all directions utilizing iris scissors.  The opposing transposition arms were then transposed into place in opposite direction and anchored with interrupted buried subcutaneous sutures.
Epidermal Closure: running cuticular
Tumor Debulked?: curette
Trilobed Flap Text: The defect edges were debeveled with a #15 scalpel blade.  Given the location of the defect and the proximity to free margins a trilobed flap was deemed most appropriate.  Using a sterile surgical marker, an appropriate trilobed flap drawn around the defect.    The area thus outlined was incised deep to adipose tissue with a #15 scalpel blade.  The skin margins were undermined to an appropriate distance in all directions utilizing iris scissors.
Mart-1 - Positive Histology Text: MART-1 staining demonstrates areas of higher density and clustering of melanocytes with Pagetoid spread upwards within the epidermis. The surgical margins are positive for tumor cells.
Star Wedge Flap Text: The defect edges were debeveled with a #15 scalpel blade.  Given the location of the defect, shape of the defect and the proximity to free margins a star wedge flap was deemed most appropriate.  Using a sterile surgical marker, an appropriate rotation flap was drawn incorporating the defect and placing the expected incisions within the relaxed skin tension lines where possible. The area thus outlined was incised deep to adipose tissue with a #15 scalpel blade.  The skin margins were undermined to an appropriate distance in all directions utilizing iris scissors.
Bilateral Rotation Flap Text: The defect edges were debeveled with a #15 scalpel blade. Given the location of the defect, shape of the defect and the proximity to free margins a bilateral rotation flap was deemed most appropriate. Using a sterile surgical marker, an appropriate rotation flap was drawn incorporating the defect and placing the expected incisions within the relaxed skin tension lines where possible. The area thus outlined was incised deep to adipose tissue with a #15 scalpel blade. The skin margins were undermined to an appropriate distance in all directions utilizing iris scissors. Following this, the designed flap was carried over into the primary defect and sutured into place.
Wound Check: 6 weeks
Non-Graft Cartilage Fenestration Text: The cartilage was fenestrated with a 2mm punch biopsy to help facilitate healing.
Estimated Blood Loss (Cc): minimal
Double O-Z Flap Text: The defect edges were debeveled with a #15 scalpel blade.  Given the location of the defect, shape of the defect and the proximity to free margins a Double O-Z flap was deemed most appropriate.  Using a sterile surgical marker, an appropriate transposition flap was drawn incorporating the defect and placing the expected incisions within the relaxed skin tension lines where possible. The area thus outlined was incised deep to adipose tissue with a #15 scalpel blade.  The skin margins were undermined to an appropriate distance in all directions utilizing iris scissors.
Initial Size Of Lesion: 2.3
Graft Cartilage Fenestration Text: The cartilage was fenestrated with a 2mm punch biopsy to help facilitate graft survival and healing.
Closure 2 Information: This tab is for additional flaps and grafts, including complex repair and grafts and complex repair and flaps. You can also specify a different location for the additional defect, if the location is the same you do not need to select a new one. We will insert the automated text for the repair you select below just as we do for solitary flaps and grafts. Please note that at this time if you select a location with a different insurance zone you will need to override the ICD10 and CPT if appropriate.
Modified Advancement Flap Text: The defect edges were debeveled with a #15 scalpel blade.  Given the location of the defect, shape of the defect and the proximity to free margins a modified advancement flap was deemed most appropriate.  Using a sterile surgical marker, an appropriate advancement flap was drawn incorporating the defect and placing the expected incisions within the relaxed skin tension lines where possible.    The area thus outlined was incised deep to adipose tissue with a #15 scalpel blade.  The skin margins were undermined to an appropriate distance in all directions utilizing iris scissors.
Xenograft Text: The defect edges were debeveled with a #15 scalpel blade.  Given the location of the defect, shape of the defect and the proximity to free margins a xenograft was deemed most appropriate.  The graft was then trimmed to fit the size of the defect.  The graft was then placed in the primary defect and oriented appropriately.
Nasalis Myocutaneous Flap Text: Using a #15 blade, an incision was made around the donor flap to the level of the nasalis muscle. Wide lateral undermining was then performed in both the subcutaneous plane above the nasalis muscle, and in a submuscular plane just above periosteum. This allowed the formation of a free nasalis muscle axial pedicle which was still attached to the actual cutaneous flap, increasing its mobility and vascular viability. Hemostasis was obtained with pinpoint electrocoagulation. The flap was mobilized into position and the pivotal anchor points positioned and stabilized with buried interrupted sutures. Subcutaneous and dermal tissues were closed in a multilayered fashion with sutures. Tissue redundancies were excised, and the epidermal edges were apposed without significant tension and sutured with sutures.
Rectangular Flap Text: The defect edges were debeveled with a #15 scalpel blade. Given the location of the defect and the proximity to free margins a rectangular flap was deemed most appropriate. Using a sterile surgical marker, an appropriate rectangular flap was drawn incorporating the defect. The area thus outlined was incised deep to adipose tissue with a #15 scalpel blade. The skin margins were undermined to an appropriate distance in all directions utilizing iris scissors. Following this, the designed flap was carried over into the primary defect and sutured into place.
Which Instrument Did You Use For Dermabrasion?: Wire Brush
Localized Dermabrasion With Sand Papertext: The patient was draped in routine manner.  Localized dermabrasion using sterile sand paper was performed in routine manner to papillary dermis. This spot dermabrasion is being performed to complete skin cancer reconstruction. It also will eliminate the other sun damaged precancerous cells that are known to be part of the regional effect of a lifetime's worth of sun exposure. This localized dermabrasion is therapeutic and should not be considered cosmetic in any regard.
Eyelid Partial Thickness Repair - [id number]: The eyelid defect was partial thickness which required a wedge repair of the eyelid. Special care was taken to ensure that the eyelid margin was realligned when placing sutures.
Localized Dermabrasion With 15 Blade Text: The patient was draped in routine manner.  Localized dermabrasion using a 15 blade was performed in routine manner to papillary dermis. This spot dermabrasion is being performed to complete skin cancer reconstruction. It also will eliminate the other sun damaged precancerous cells that are known to be part of the regional effect of a lifetime's worth of sun exposure. This localized dermabrasion is therapeutic and should not be considered cosmetic in any regard.

## 2024-03-27 NOTE — HPI: PROCEDURE (MOHS)
Has The Growth Been Previously Biopsied?: has been previously biopsied
Body Location Override (Optional): right preauricular